# Patient Record
Sex: MALE | Race: WHITE | NOT HISPANIC OR LATINO | Employment: UNEMPLOYED | URBAN - NONMETROPOLITAN AREA
[De-identification: names, ages, dates, MRNs, and addresses within clinical notes are randomized per-mention and may not be internally consistent; named-entity substitution may affect disease eponyms.]

---

## 2017-01-04 ENCOUNTER — OFFICE VISIT (OUTPATIENT)
Dept: FAMILY MEDICINE CLINIC | Facility: CLINIC | Age: 22
End: 2017-01-04

## 2017-01-04 VITALS
DIASTOLIC BLOOD PRESSURE: 85 MMHG | HEART RATE: 87 BPM | WEIGHT: 173 LBS | BODY MASS INDEX: 22.93 KG/M2 | HEIGHT: 73 IN | RESPIRATION RATE: 13 BRPM | SYSTOLIC BLOOD PRESSURE: 139 MMHG | TEMPERATURE: 98.3 F | OXYGEN SATURATION: 100 %

## 2017-01-04 DIAGNOSIS — F41.9 ANXIETY AND DEPRESSION: Primary | ICD-10-CM

## 2017-01-04 DIAGNOSIS — F32.A ANXIETY AND DEPRESSION: Primary | ICD-10-CM

## 2017-01-04 PROBLEM — A74.9 CHLAMYDIA: Status: ACTIVE | Noted: 2017-01-04

## 2017-01-04 PROCEDURE — 99214 OFFICE O/P EST MOD 30 MIN: CPT | Performed by: INTERNAL MEDICINE

## 2017-01-04 RX ORDER — FLUOXETINE 10 MG/1
10 CAPSULE ORAL DAILY
Qty: 30 CAPSULE | Refills: 2 | Status: SHIPPED | OUTPATIENT
Start: 2017-01-04 | End: 2017-02-02 | Stop reason: SDUPTHER

## 2017-01-04 NOTE — PROGRESS NOTES
Chief Complaint   Patient presents with   • Follow-up     Patient is here to be seen for depression and anxiety.        Subjective     History of Present Illness   Duy Thompson is a 21 y.o. male with h/o anxiety and depression who presents for follow-up.  Patient states that he recently saw psychology at [facility] and was advised to consider starting Prozac.  Patient has formerly been on sertraline in the past but states that it made him feel like a zombie.  He would like to try a different SSRI medication to help with his symptoms.  Patient states that his most significant symptoms from his anxiety are typically anger outbreaks which result in bad consequences. He will continue following [facility] for counseling.      The following portions of the patient's history were reviewed and updated as appropriate: allergies, current medications, past family history, past medical history, past social history, past surgical history and problem list.    Review of Systems   Constitutional: Negative.    HENT: Negative.    Eyes: Negative.    Respiratory: Negative.    Cardiovascular: Negative.    Gastrointestinal: Negative.    Endocrine: Negative.    Genitourinary: Negative.    Musculoskeletal: Negative.    Skin: Negative.    Neurological: Negative.    Psychiatric/Behavioral: Positive for decreased concentration and dysphoric mood. The patient is nervous/anxious.        No Known Allergies    Past Medical History   Diagnosis Date   • ADHD (attention deficit hyperactivity disorder)    • Depression    • PTSD (post-traumatic stress disorder)    • Suicide attempt    • UTI (urinary tract infection)        Social History     Social History   • Marital status:      Spouse name: N/A   • Number of children: N/A   • Years of education: N/A     Occupational History   • Not on file.     Social History Main Topics   • Smoking status: Never Smoker   • Smokeless tobacco: Not on file   • Alcohol use No   • Drug use: No   • Sexual  "activity: Defer     Other Topics Concern   • Not on file     Social History Narrative       Past Surgical History   Procedure Laterality Date   • Appendectomy  2013   • Eye surgery Bilateral      as a child       Family History   Problem Relation Age of Onset   • Alcohol abuse Father    • Suicide Attempts Brother    • Alcohol abuse Brother    • Drug abuse Paternal Aunt    • Suicide Attempts Maternal Grandmother    • Drug abuse Cousin    • Lung cancer Paternal Grandmother          Current Outpatient Prescriptions:   •  FLUoxetine (PROZAC) 10 MG capsule, Take 1 capsule by mouth Daily., Disp: 30 capsule, Rfl: 2    Objective   Visit Vitals   • /85 (BP Location: Right arm, Patient Position: Sitting, Cuff Size: Adult)   • Pulse 87   • Temp 98.3 °F (36.8 °C) (Oral)   • Resp 13   • Ht 73\" (185.4 cm)   • Wt 173 lb (78.5 kg)   • SpO2 100%   • BMI 22.82 kg/m2       Physical Exam   Constitutional: He is oriented to person, place, and time. He appears well-developed and well-nourished.   HENT:   Head: Normocephalic and atraumatic.   Eyes: Conjunctivae are normal.   Pulmonary/Chest: Effort normal.   Musculoskeletal: Normal range of motion.   Neurological: He is alert and oriented to person, place, and time.   Skin:   Numerous tattoos   Psychiatric: He has a normal mood and affect. His behavior is normal.   Nursing note and vitals reviewed.      Assessment/Plan   Duy was seen today for follow-up.    Diagnoses and all orders for this visit:    Anxiety and depression  -     FLUoxetine (PROZAC) 10 MG capsule; Take 1 capsule by mouth Daily.          Discussion Summary:  21-year-old white male presenting for anxiety/anger outbreaks/depression.  Patient started on low-dose Prozac as his current symptoms are fairly mild.  He may consider self-tapering to 2 tablets per day after 2 weeks if he feels it is necessary.  Patient was advised to contact the clinic should he do so.  We will follow up in 1 month to ensure appropriate " control of his mood.      Follow up:  Return in about 1 month (around 2/4/2017).     Patient Instructions:  Patient instructions were provided.

## 2017-01-04 NOTE — PATIENT INSTRUCTIONS
Fluoxetine capsules or tablets (Depression/Mood Disorders)  What is this medicine?  FLUOXETINE (floo OX e teen) belongs to a class of drugs known as selective serotonin reuptake inhibitors (SSRIs). It helps to treat mood problems such as depression, obsessive compulsive disorder, and panic attacks. It can also treat certain eating disorders.  This medicine may be used for other purposes; ask your health care provider or pharmacist if you have questions.  What should I tell my health care provider before I take this medicine?  They need to know if you have any of these conditions:  -bipolar disorder or raza  -diabetes  -glaucoma  -liver disease  -psychosis  -seizures  -suicidal thoughts or history of attempted suicide  -an unusual or allergic reaction to fluoxetine, other medicines, foods, dyes, or preservatives  -pregnant or trying to get pregnant  -breast-feeding  How should I use this medicine?  Take this medicine by mouth with a glass of water. Follow the directions on the prescription label. You can take this medicine with or without food. Take your medicine at regular intervals. Do not take it more often than directed. Do not stop taking this medicine suddenly except upon the advice of your doctor. Stopping this medicine too quickly may cause serious side effects or your condition may worsen.  A special MedGuide will be given to you by the pharmacist with each prescription and refill. Be sure to read this information carefully each time.  Talk to your pediatrician regarding the use of this medicine in children. While this drug may be prescribed for children as young as 7 years for selected conditions, precautions do apply.  Overdosage: If you think you have taken too much of this medicine contact a poison control center or emergency room at once.  NOTE: This medicine is only for you. Do not share this medicine with others.  What if I miss a dose?  If you miss a dose, skip the missed dose and go back to your  regular dosing schedule. Do not take double or extra doses.  What may interact with this medicine?  Do not take fluoxetine with any of the following medications:  -other medicines containing fluoxetine, like Sarafem or Symbyax  -cisapride  -linezolid  -MAOIs like Carbex, Eldepryl, Marplan, Nardil, and Parnate  -methylene blue (injected into a vein)  -pimozide  -thioridazine  This medicine may also interact with the following medications:  -alcohol  -aspirin and aspirin-like medicines  -carbamazepine  -certain medicines for depression, anxiety, or psychotic disturbances  -certain medicines for migraine headaches like almotriptan, eletriptan, frovatriptan, naratriptan, rizatriptan, sumatriptan, zolmitriptan  -digoxin  -diuretics  -fentanyl  -flecainide  -furazolidone  -isoniazid  -lithium  -medicines for sleep  -medicines that treat or prevent blood clots like warfarin, enoxaparin, and dalteparin  -NSAIDs, medicines for pain and inflammation, like ibuprofen or naproxen  -phenytoin  -procarbazine  -propafenone  -rasagiline  -ritonavir  -supplements like Angie's wort, kava kava, valerian  -tramadol  -tryptophan  -vinblastine  This list may not describe all possible interactions. Give your health care provider a list of all the medicines, herbs, non-prescription drugs, or dietary supplements you use. Also tell them if you smoke, drink alcohol, or use illegal drugs. Some items may interact with your medicine.  What should I watch for while using this medicine?  Tell your doctor if your symptoms do not get better or if they get worse. Visit your doctor or health care professional for regular checks on your progress. Because it may take several weeks to see the full effects of this medicine, it is important to continue your treatment as prescribed by your doctor.  Patients and their families should watch out for new or worsening thoughts of suicide or depression. Also watch out for sudden changes in feelings such as  feeling anxious, agitated, panicky, irritable, hostile, aggressive, impulsive, severely restless, overly excited and hyperactive, or not being able to sleep. If this happens, especially at the beginning of treatment or after a change in dose, call your health care professional.  You may get drowsy or dizzy. Do not drive, use machinery, or do anything that needs mental alertness until you know how this medicine affects you. Do not stand or sit up quickly, especially if you are an older patient. This reduces the risk of dizzy or fainting spells. Alcohol may interfere with the effect of this medicine. Avoid alcoholic drinks.  Your mouth may get dry. Chewing sugarless gum or sucking hard candy, and drinking plenty of water may help. Contact your doctor if the problem does not go away or is severe.  This medicine may affect blood sugar levels. If you have diabetes, check with your doctor or health care professional before you change your diet or the dose of your diabetic medicine.  What side effects may I notice from receiving this medicine?  Side effects that you should report to your doctor or health care professional as soon as possible:  -allergic reactions like skin rash, itching or hives, swelling of the face, lips, or tongue  -breathing problems  -confusion  -eye pain, changes in vision  -fast or irregular heart rate, palpitations  -flu-like fever, chills, cough, muscle or joint aches and pains  -seizures  -suicidal thoughts or other mood changes  -swelling or redness in or around the eye  -tremors  -trouble sleeping  -unusual bleeding or bruising  -unusually tired or weak  -vomiting  Side effects that usually do not require medical attention (report to your doctor or health care professional if they continue or are bothersome):  -change in sex drive or performance  -diarrhea  -dry mouth  -flushing  -headache  -increased or decreased appetite  -nausea  -sweating  This list may not describe all possible side  effects. Call your doctor for medical advice about side effects. You may report side effects to FDA at 8-281-VCJ-9670.  Where should I keep my medicine?  Keep out of the reach of children.  Store at room temperature between 15 and 30 degrees C (59 and 86 degrees F). Throw away any unused medicine after the expiration date.  NOTE: This sheet is a summary. It may not cover all possible information. If you have questions about this medicine, talk to your doctor, pharmacist, or health care provider.     © 2016, [employer] Standard. (2015-12-11 12:40:07)

## 2017-01-04 NOTE — MR AVS SNAPSHOT
Duy Thompson   1/4/2017 1:00 PM   Office Visit    Dept Phone:  [phone]   Encounter #:  [id number]    Provider:  Devyn Mcelroy DO   Department:  [facility] PRIMARY CARE                Your Full Care Plan              Today's Medication Changes          These changes are accurate as of: 1/4/17  2:57 PM.  If you have any questions, ask your nurse or doctor.               New Medication(s)Ordered:     FLUoxetine 10 MG capsule   Commonly known as:  PROZAC   Take 1 capsule by mouth Daily.         Stop taking medication(s)listed here:     azithromycin 250 MG tablet   Commonly known as:  ZITHROMAX Z-KLAUS           doxycycline 100 MG capsule   Commonly known as:  MONODOX                Where to Get Your Medications      These medications were sent to [address] - [address] [place] [address] - [phone] [place] [id number] [address] [address]     Phone:  [phone]     FLUoxetine 10 MG capsule                  Your Updated Medication List          This list is accurate as of: 1/4/17  2:57 PM.  Always use your most recent med list.                FLUoxetine 10 MG capsule   Commonly known as:  PROZAC   Take 1 capsule by mouth Daily.               You Were Diagnosed With        Codes Comments    Anxiety and depression    -  Primary ICD-10-CM: F41.9, F32.9  ICD-9-CM: 300.00, 311       Instructions    Fluoxetine capsules or tablets (Depression/Mood Disorders)  What is this medicine?  FLUOXETINE (floo OX e teen) belongs to a class of drugs known as selective serotonin reuptake inhibitors (SSRIs). It helps to treat mood problems such as depression, obsessive compulsive disorder, and panic attacks. It can also treat certain eating disorders.  This medicine may be used for other purposes; ask your health care provider or pharmacist if you have questions.  What should I tell my health care provider before I take this medicine?  They need  to know if you have any of these conditions:  -bipolar disorder or raza  -diabetes  -glaucoma  -liver disease  -psychosis  -seizures  -suicidal thoughts or history of attempted suicide  -an unusual or allergic reaction to fluoxetine, other medicines, foods, dyes, or preservatives  -pregnant or trying to get pregnant  -breast-feeding  How should I use this medicine?  Take this medicine by mouth with a glass of water. Follow the directions on the prescription label. You can take this medicine with or without food. Take your medicine at regular intervals. Do not take it more often than directed. Do not stop taking this medicine suddenly except upon the advice of your doctor. Stopping this medicine too quickly may cause serious side effects or your condition may worsen.  A special MedGuide will be given to you by the pharmacist with each prescription and refill. Be sure to read this information carefully each time.  Talk to your pediatrician regarding the use of this medicine in children. While this drug may be prescribed for children as young as 7 years for selected conditions, precautions do apply.  Overdosage: If you think you have taken too much of this medicine contact a poison control center or emergency room at once.  NOTE: This medicine is only for you. Do not share this medicine with others.  What if I miss a dose?  If you miss a dose, skip the missed dose and go back to your regular dosing schedule. Do not take double or extra doses.  What may interact with this medicine?  Do not take fluoxetine with any of the following medications:  -other medicines containing fluoxetine, like Sarafem or Symbyax  -cisapride  -linezolid  -MAOIs like Carbex, Eldepryl, Marplan, Nardil, and Parnate  -methylene blue (injected into a vein)  -pimozide  -thioridazine  This medicine may also interact with the following medications:  -alcohol  -aspirin and aspirin-like medicines  -carbamazepine  -certain medicines for depression,  anxiety, or psychotic disturbances  -certain medicines for migraine headaches like almotriptan, eletriptan, frovatriptan, naratriptan, rizatriptan, sumatriptan, zolmitriptan  -digoxin  -diuretics  -fentanyl  -flecainide  -furazolidone  -isoniazid  -lithium  -medicines for sleep  -medicines that treat or prevent blood clots like warfarin, enoxaparin, and dalteparin  -NSAIDs, medicines for pain and inflammation, like ibuprofen or naproxen  -phenytoin  -procarbazine  -propafenone  -rasagiline  -ritonavir  -supplements like Angie's wort, kava kava, valerian  -tramadol  -tryptophan  -vinblastine  This list may not describe all possible interactions. Give your health care provider a list of all the medicines, herbs, non-prescription drugs, or dietary supplements you use. Also tell them if you smoke, drink alcohol, or use illegal drugs. Some items may interact with your medicine.  What should I watch for while using this medicine?  Tell your doctor if your symptoms do not get better or if they get worse. Visit your doctor or health care professional for regular checks on your progress. Because it may take several weeks to see the full effects of this medicine, it is important to continue your treatment as prescribed by your doctor.  Patients and their families should watch out for new or worsening thoughts of suicide or depression. Also watch out for sudden changes in feelings such as feeling anxious, agitated, panicky, irritable, hostile, aggressive, impulsive, severely restless, overly excited and hyperactive, or not being able to sleep. If this happens, especially at the beginning of treatment or after a change in dose, call your health care professional.  You may get drowsy or dizzy. Do not drive, use machinery, or do anything that needs mental alertness until you know how this medicine affects you. Do not stand or sit up quickly, especially if you are an older patient. This reduces the risk of dizzy or fainting  spells. Alcohol may interfere with the effect of this medicine. Avoid alcoholic drinks.  Your mouth may get dry. Chewing sugarless gum or sucking hard candy, and drinking plenty of water may help. Contact your doctor if the problem does not go away or is severe.  This medicine may affect blood sugar levels. If you have diabetes, check with your doctor or health care professional before you change your diet or the dose of your diabetic medicine.  What side effects may I notice from receiving this medicine?  Side effects that you should report to your doctor or health care professional as soon as possible:  -allergic reactions like skin rash, itching or hives, swelling of the face, lips, or tongue  -breathing problems  -confusion  -eye pain, changes in vision  -fast or irregular heart rate, palpitations  -flu-like fever, chills, cough, muscle or joint aches and pains  -seizures  -suicidal thoughts or other mood changes  -swelling or redness in or around the eye  -tremors  -trouble sleeping  -unusual bleeding or bruising  -unusually tired or weak  -vomiting  Side effects that usually do not require medical attention (report to your doctor or health care professional if they continue or are bothersome):  -change in sex drive or performance  -diarrhea  -dry mouth  -flushing  -headache  -increased or decreased appetite  -nausea  -sweating  This list may not describe all possible side effects. Call your doctor for medical advice about side effects. You may report side effects to FDA at 2-169-FDA-9179.  Where should I keep my medicine?  Keep out of the reach of children.  Store at room temperature between 15 and 30 degrees C (59 and 86 degrees F). Throw away any unused medicine after the expiration date.  NOTE: This sheet is a summary. It may not cover all possible information. If you have questions about this medicine, talk to your doctor, pharmacist, or health care provider.     © 2016, [employer] Standard. (2015-12-11  "12:40:07)       Patient Instructions History      Upcoming Appointments     Visit Type Date Time Department    OFFICE VISIT 2017  1:00 PM [facility]      [employer] Signup     [facility] [employer] allows you to send messages to your doctor, view your test results, renew your prescriptions, schedule appointments, and more. To sign up, go to [employer] and click on the Sign Up Now link in the New User? box. Enter your [employer] Activation Code exactly as it appears below along with the last four digits of your Social Security Number and your Date of Birth () to complete the sign-up process. If you do not sign up before the expiration date, you must request a new code.    [employer] Activation Code: [email or web address]  Expires: 2017  5:36 AM    If you have questions, you can email [email or web address] or call [phone] to talk to our [employer] staff. Remember, [employer] is NOT to be used for urgent needs. For medical emergencies, dial 911.               Other Info from Your Visit           Allergies     No Known Allergies      Reason for Visit     Follow-up Patient is here to be seen for depression and anxiety.       Vital Signs     Blood Pressure Pulse Temperature Respirations Height Weight    139/85 (BP Location: Right arm, Patient Position: Sitting, Cuff Size: Adult) 87 98.3 °F (36.8 °C) (Oral) 13 73\" (185.4 cm) 173 lb (78.5 kg)    Oxygen Saturation Body Mass Index Smoking Status             100% 22.82 kg/m2 Never Smoker         Problems and Diagnoses Noted     Chlamydia    Anxiety and depression    -  Primary        "

## 2017-02-02 DIAGNOSIS — F32.A ANXIETY AND DEPRESSION: ICD-10-CM

## 2017-02-02 DIAGNOSIS — F41.9 ANXIETY AND DEPRESSION: ICD-10-CM

## 2017-02-02 RX ORDER — FLUOXETINE 10 MG/1
10 CAPSULE ORAL DAILY
Qty: 30 CAPSULE | Refills: 2 | Status: SHIPPED | OUTPATIENT
Start: 2017-02-02 | End: 2017-03-29 | Stop reason: SDUPTHER

## 2017-03-23 ENCOUNTER — TELEPHONE (OUTPATIENT)
Dept: FAMILY MEDICINE CLINIC | Facility: CLINIC | Age: 22
End: 2017-03-23

## 2017-03-29 ENCOUNTER — OFFICE VISIT (OUTPATIENT)
Dept: FAMILY MEDICINE CLINIC | Facility: CLINIC | Age: 22
End: 2017-03-29

## 2017-03-29 VITALS
BODY MASS INDEX: 22.13 KG/M2 | HEIGHT: 73 IN | WEIGHT: 167 LBS | DIASTOLIC BLOOD PRESSURE: 71 MMHG | SYSTOLIC BLOOD PRESSURE: 127 MMHG | OXYGEN SATURATION: 99 % | HEART RATE: 110 BPM | TEMPERATURE: 99.6 F | RESPIRATION RATE: 16 BRPM

## 2017-03-29 DIAGNOSIS — F32.A ANXIETY AND DEPRESSION: ICD-10-CM

## 2017-03-29 DIAGNOSIS — A74.9 CHLAMYDIA: Primary | ICD-10-CM

## 2017-03-29 DIAGNOSIS — F41.9 ANXIETY AND DEPRESSION: ICD-10-CM

## 2017-03-29 PROCEDURE — 99213 OFFICE O/P EST LOW 20 MIN: CPT | Performed by: INTERNAL MEDICINE

## 2017-03-29 PROCEDURE — 96372 THER/PROPH/DIAG INJ SC/IM: CPT | Performed by: INTERNAL MEDICINE

## 2017-03-29 RX ORDER — FLUOXETINE 10 MG/1
10 CAPSULE ORAL DAILY
Qty: 30 CAPSULE | Refills: 2 | Status: SHIPPED | OUTPATIENT
Start: 2017-03-29 | End: 2017-06-01 | Stop reason: SDUPTHER

## 2017-03-29 RX ORDER — CEFTRIAXONE 1 G/1
0.25 INJECTION, POWDER, FOR SOLUTION INTRAMUSCULAR; INTRAVENOUS EVERY 24 HOURS
Status: DISCONTINUED | OUTPATIENT
Start: 2017-03-29 | End: 2017-03-29

## 2017-03-29 RX ORDER — CEFTRIAXONE 1 G/1
0.25 INJECTION, POWDER, FOR SOLUTION INTRAMUSCULAR; INTRAVENOUS ONCE
Status: COMPLETED | OUTPATIENT
Start: 2017-03-29 | End: 2017-03-29

## 2017-03-29 RX ORDER — AZITHROMYCIN 250 MG/1
TABLET, FILM COATED ORAL
Qty: 4 TABLET | Refills: 0 | Status: SHIPPED | OUTPATIENT
Start: 2017-03-29 | End: 2017-06-01

## 2017-03-29 RX ORDER — FLUOXETINE 10 MG/1
10 CAPSULE ORAL DAILY
Qty: 30 CAPSULE | Refills: 2 | Status: SHIPPED | OUTPATIENT
Start: 2017-03-29 | End: 2017-03-29 | Stop reason: SDUPTHER

## 2017-03-29 RX ADMIN — CEFTRIAXONE 0.25 G: 1 INJECTION, POWDER, FOR SOLUTION INTRAMUSCULAR; INTRAVENOUS at 17:58

## 2017-03-29 NOTE — PROGRESS NOTES
Fells good with Prozac. Wants to up the dose of Prozac? Mood is better. Not following up with a psychologist    Congestion for few days. Fever, chills, cough with yellow-green sputum. No chest pain, chest congestion, no abdominal pain, head aches,     Chlamydia: One week ago, urethral burning, throat itching, no discharge. Dysuria, no blood in urine, Sexually active with one female, she has been treated and abstaining from sex. Last episode chlamydia was in September.

## 2017-03-29 NOTE — PROGRESS NOTES
Chief Complaint   Patient presents with   • Follow-up     Patient states he thinks he has an STD. Patient states he needs medication refills.    • Sore Throat       Subjective     History of Present Illness   Duy Thompson is a 21 y.o. male with h/o high risk sexual behaviors presenting for concerns of recurrence of chlamydia.  Patient  States he has had burning at the tip of his penis and mild discharge for the last 1 week.  He also has concerns about cough with yellow- green sputum.  Pt has abstained from sex since symptoms began.  He is sexually active with one female in close relationship recently.    The following portions of the patient's history were reviewed and updated as appropriate: allergies, current medications, past family history, past medical history, past social history, past surgical history and problem list.    Review of Systems   Constitutional: Negative for chills, fatigue and fever.   HENT: Negative for congestion, ear pain, rhinorrhea, sinus pressure and sore throat.    Eyes: Negative for visual disturbance.   Respiratory: Negative for cough, chest tightness, shortness of breath and wheezing.    Cardiovascular: Negative for chest pain, palpitations and leg swelling.   Gastrointestinal: Negative for abdominal pain, blood in stool, constipation, diarrhea, nausea and vomiting.   Endocrine: Negative for polydipsia and polyuria.   Genitourinary: Positive for discharge and dysuria. Negative for hematuria.   Musculoskeletal: Negative for back pain.   Skin: Negative for rash.   Neurological: Negative for dizziness, light-headedness, numbness and headaches.   Psychiatric/Behavioral: Negative for dysphoric mood and sleep disturbance. The patient is not nervous/anxious.        No Known Allergies    Past Medical History:   Diagnosis Date   • ADHD (attention deficit hyperactivity disorder)    • Depression    • PTSD (post-traumatic stress disorder)    • Suicide attempt    • UTI (urinary tract infection)   "      Social History     Social History   • Marital status:      Spouse name: N/A   • Number of children: N/A   • Years of education: N/A     Occupational History   • Not on file.     Social History Main Topics   • Smoking status: Never Smoker   • Smokeless tobacco: Not on file   • Alcohol use No   • Drug use: No   • Sexual activity: Defer     Other Topics Concern   • Not on file     Social History Narrative        Past Surgical History:   Procedure Laterality Date   • APPENDECTOMY  2013   • EYE SURGERY Bilateral     as a child       Family History   Problem Relation Age of Onset   • Alcohol abuse Father    • Suicide Attempts Brother    • Alcohol abuse Brother    • Drug abuse Paternal Aunt    • Suicide Attempts Maternal Grandmother    • Drug abuse Cousin    • Lung cancer Paternal Grandmother          Current Outpatient Prescriptions:   •  FLUoxetine (PROZAC) 10 MG capsule, Take 1 capsule by mouth Daily., Disp: 30 capsule, Rfl: 2  •  azithromycin (ZITHROMAX Z-KLAUS) 250 MG tablet, Take 1 gram once.  (4 tabs), Disp: 4 tablet, Rfl: 0    Current Facility-Administered Medications:   •  cefTRIAXone (ROCEPHIN) injection 0.25 g, 0.25 g, Intramuscular, Once, Devyn Mcelroy,     Objective   /71 (BP Location: Right arm, Patient Position: Sitting, Cuff Size: Adult)  Pulse 110  Temp 99.6 °F (37.6 °C) (Oral)   Resp 16  Ht 73\" (185.4 cm)  Wt 167 lb (75.8 kg)  SpO2 99%  BMI 22.03 kg/m2    Physical Exam   Constitutional: He is oriented to person, place, and time. He appears well-developed and well-nourished.   HENT:   Head: Normocephalic and atraumatic.   Eyes: Conjunctivae are normal.   Pulmonary/Chest: Effort normal.   Musculoskeletal: Normal range of motion.   Neurological: He is alert and oriented to person, place, and time.   Psychiatric: He has a normal mood and affect. His behavior is normal.   Nursing note and vitals reviewed.      Assessment/Plan   Duy was seen today for follow-up and sore " throat.    Diagnoses and all orders for this visit:    Chlamydia  -     Discontinue: cefTRIAXone (ROCEPHIN) injection 0.25 g; Inject 0.25 g into the shoulder, thigh, or buttocks Daily.  -     azithromycin (ZITHROMAX Z-KLAUS) 250 MG tablet; Take 1 gram once.  (4 tabs)  -     C.trachomatis/N. gonorrhoeae PCR Urine  -     cefTRIAXone (ROCEPHIN) injection 0.25 g; Inject 0.25 g into the shoulder, thigh, or buttocks 1 (One) Time.    Anxiety and depression  -     Discontinue: FLUoxetine (PROZAC) 10 MG capsule; Take 1 capsule by mouth Daily.  -     FLUoxetine (PROZAC) 10 MG capsule; Take 1 capsule by mouth Daily.          Discussion Summary:    20 yo W M presenting with chlamydia.  Ceftriaxone and 1g azithromycin started.  Check urine studies.     Prozac is controlling anxiety symptoms, refilled today, cont current dose.    Follow up:  No Follow-up on file.     Patient Instructions:  Patient instructions were provided.

## 2017-03-29 NOTE — PATIENT INSTRUCTIONS
Chlamydia, Male  Chlamydia is an infection. It is spread through sexual contact. Chlamydia can be in different areas of the body. These areas include the urethra, throat, or rectum. It is important to treat chlamydia as soon as possible. It can damage other organs.   CAUSES   Chlamydia is caused by bacteria. It is a sexually transmitted disease. This means that it is passed from an infected partner during intimate contact. This contact could be with the genitals, mouth, or rectal area.   SIGNS AND SYMPTOMS   There may not be any symptoms. This is often the case early in the infection. If there are symptoms, they are usually mild and may only be noticeable in the morning. Symptoms you may notice include:   · Burning with urination.  · Pain or swelling in the testicles.  · Watery mucus-like discharge from the penis.  · Long-standing (chronic) pelvic pain after frequent infections.  · Pain, swelling, or itching around the anus.  · A sore throat.  · Itching, burning, or redness in the eyes, or discharge from the eyes.  DIAGNOSIS   To diagnose this infection, your health care provider will do a pelvic exam. A sample of urine or a swab from the rectum may be taken for testing.   TREATMENT   Chlamydia is treated with antibiotic medicines. Your health care provider may test you for infection again 3 months after treatment.  HOME CARE INSTRUCTIONS  · Take your antibiotic medicine as directed by your health care provider. Finish the antibiotic even if you start to feel better. Incomplete treatment will put you at risk for not being able to have children (sterility).    · Take medicines only as directed by your health care provider.    · Rest.    · Inform any sexual partners about your infection. Even if they are symptom free or have a negative culture or evaluation, they should be treated for the condition.    · Do not have sex (intercourse) until treatment is completed and your health care provider says it is okay.    · Keep  all follow-up visits as directed by your health care provider.    · Not all test results are available during your visit. If your test results are not back during the visit, make an appointment with your health care provider to find out the results. Do not assume everything is normal if you have not heard from your health care provider or the medical facility. It is your responsibility to get your test results.  SEEK MEDICAL CARE IF:  · You develop new joint pain.  · You have a fever.  SEEK IMMEDIATE MEDICAL CARE IF:   · Your pain increases.    · You have abnormal discharge.    · You have pain during intercourse.  MAKE SURE YOU:   · Understand these instructions.  · Will watch your condition.  · Will get help right away if you are not doing well or get worse.     This information is not intended to replace advice given to you by your health care provider. Make sure you discuss any questions you have with your health care provider.     Document Released: 12/18/2006 Document Revised: 01/08/2016 Document Reviewed: 06/26/2014  Elsevier Interactive Patient Education ©2016 [employer]

## 2017-03-31 LAB
C TRACH RRNA SPEC QL NAA+PROBE: NEGATIVE
N GONORRHOEA RRNA SPEC QL NAA+PROBE: NEGATIVE

## 2017-04-26 NOTE — TELEPHONE ENCOUNTER
----- Message from Karli Lacy sent at 3/23/2017  3:20 PM EDT -----  Pt called asking to get a refill on an antibiotic for chlamydia [facility] [place]   
Patient called I scheduled him for 04/04/17.  
Pt needs appt, or can see UTC
16

## 2017-05-23 DIAGNOSIS — A74.9 CHLAMYDIA: ICD-10-CM

## 2017-05-23 RX ORDER — AZITHROMYCIN 250 MG/1
TABLET, FILM COATED ORAL
Qty: 4 TABLET | Refills: 0 | OUTPATIENT
Start: 2017-05-23

## 2017-05-30 ENCOUNTER — TELEPHONE (OUTPATIENT)
Dept: FAMILY MEDICINE CLINIC | Facility: CLINIC | Age: 22
End: 2017-05-30

## 2017-06-01 ENCOUNTER — OFFICE VISIT (OUTPATIENT)
Dept: FAMILY MEDICINE CLINIC | Facility: CLINIC | Age: 22
End: 2017-06-01

## 2017-06-01 VITALS
DIASTOLIC BLOOD PRESSURE: 77 MMHG | HEART RATE: 77 BPM | SYSTOLIC BLOOD PRESSURE: 118 MMHG | BODY MASS INDEX: 23.19 KG/M2 | OXYGEN SATURATION: 98 % | HEIGHT: 73 IN | TEMPERATURE: 98.9 F | WEIGHT: 175 LBS

## 2017-06-01 DIAGNOSIS — F32.A ANXIETY AND DEPRESSION: ICD-10-CM

## 2017-06-01 DIAGNOSIS — A54.9 GONORRHEA: Primary | ICD-10-CM

## 2017-06-01 DIAGNOSIS — F41.9 ANXIETY AND DEPRESSION: ICD-10-CM

## 2017-06-01 PROCEDURE — 96372 THER/PROPH/DIAG INJ SC/IM: CPT | Performed by: INTERNAL MEDICINE

## 2017-06-01 PROCEDURE — 99214 OFFICE O/P EST MOD 30 MIN: CPT | Performed by: INTERNAL MEDICINE

## 2017-06-01 RX ORDER — FLUOXETINE HYDROCHLORIDE 20 MG/1
20 CAPSULE ORAL DAILY
Qty: 30 CAPSULE | Refills: 2 | Status: SHIPPED | OUTPATIENT
Start: 2017-06-01 | End: 2019-04-12

## 2017-06-01 RX ORDER — CEFTRIAXONE 1 G/1
1 INJECTION, POWDER, FOR SOLUTION INTRAMUSCULAR; INTRAVENOUS EVERY 24 HOURS
Status: DISCONTINUED | OUTPATIENT
Start: 2017-06-01 | End: 2017-06-01

## 2017-06-01 RX ORDER — AZITHROMYCIN 500 MG/1
TABLET, FILM COATED ORAL
Qty: 2 TABLET | Refills: 0 | Status: SHIPPED | OUTPATIENT
Start: 2017-06-01 | End: 2019-04-12

## 2017-06-01 RX ORDER — CEFTRIAXONE 1 G/1
1 INJECTION, POWDER, FOR SOLUTION INTRAMUSCULAR; INTRAVENOUS ONCE
Status: DISCONTINUED | OUTPATIENT
Start: 2017-06-01 | End: 2019-12-11

## 2017-06-01 RX ADMIN — CEFTRIAXONE 1 G: 1 INJECTION, POWDER, FOR SOLUTION INTRAMUSCULAR; INTRAVENOUS at 18:03

## 2017-06-01 NOTE — PATIENT INSTRUCTIONS
Chlamydia, Male  Chlamydia is an infection. It is spread through sexual contact. Chlamydia can be in different areas of the body. These areas include the urethra, throat, or rectum. It is important to treat chlamydia as soon as possible. It can damage other organs.   CAUSES   Chlamydia is caused by bacteria. It is a sexually transmitted disease. This means that it is passed from an infected partner during intimate contact. This contact could be with the genitals, mouth, or rectal area.   SIGNS AND SYMPTOMS   There may not be any symptoms. This is often the case early in the infection. If there are symptoms, they are usually mild and may only be noticeable in the morning. Symptoms you may notice include:   · Burning with urination.  · Pain or swelling in the testicles.  · Watery mucus-like discharge from the penis.  · Long-standing (chronic) pelvic pain after frequent infections.  · Pain, swelling, or itching around the anus.  · A sore throat.  · Itching, burning, or redness in the eyes, or discharge from the eyes.  DIAGNOSIS   To diagnose this infection, your health care provider will do a pelvic exam. A sample of urine or a swab from the rectum may be taken for testing.   TREATMENT   Chlamydia is treated with antibiotic medicines. Your health care provider may test you for infection again 3 months after treatment.  HOME CARE INSTRUCTIONS  · Take your antibiotic medicine as directed by your health care provider. Finish the antibiotic even if you start to feel better. Incomplete treatment will put you at risk for not being able to have children (sterility).    · Take medicines only as directed by your health care provider.    · Rest.    · Inform any sexual partners about your infection. Even if they are symptom free or have a negative culture or evaluation, they should be treated for the condition.    · Do not have sex (intercourse) until treatment is completed and your health care provider says it is okay.    · Keep  all follow-up visits as directed by your health care provider.    · Not all test results are available during your visit. If your test results are not back during the visit, make an appointment with your health care provider to find out the results. Do not assume everything is normal if you have not heard from your health care provider or the medical facility. It is your responsibility to get your test results.  SEEK MEDICAL CARE IF:  · You develop new joint pain.  · You have a fever.  SEEK IMMEDIATE MEDICAL CARE IF:   · Your pain increases.    · You have abnormal discharge.    · You have pain during intercourse.  MAKE SURE YOU:   · Understand these instructions.  · Will watch your condition.  · Will get help right away if you are not doing well or get worse.     This information is not intended to replace advice given to you by your health care provider. Make sure you discuss any questions you have with your health care provider.     Document Released: 12/18/2006 Document Revised: 01/08/2016 Document Reviewed: 06/26/2014  Elsevier Interactive Patient Education ©2017 [employer]

## 2017-06-01 NOTE — PROGRESS NOTES
Chief Complaint   Patient presents with   • Follow-up     chlamydia; and anxiety       Subjective     History of Present Illness   Duy Thompson is a 21 y.o. male presenting with recurrent symptoms of dysuria, pelvic pain, burning, and symptoms consistent with prior episodes of chlamydia.  Patient has been sexually active, his partner is in the room and has also had symptoms and is to start treatment.     Depression is also less controlled recently with low dose prozac, he requests to increase medication dose.  The medication was helping earlier however it seems less potent per patient.    The following portions of the patient's history were reviewed and updated as appropriate: allergies, current medications, past family history, past medical history, past social history, past surgical history and problem list.    Review of Systems   Constitutional: Negative for chills, fatigue and fever.   HENT: Negative for congestion, ear pain, rhinorrhea, sinus pressure and sore throat.    Eyes: Negative for visual disturbance.   Respiratory: Negative for cough, chest tightness, shortness of breath and wheezing.    Cardiovascular: Negative for chest pain, palpitations and leg swelling.   Gastrointestinal: Negative for abdominal pain, blood in stool, constipation, diarrhea, nausea and vomiting.   Endocrine: Negative for polydipsia and polyuria.   Genitourinary: Negative for dysuria and hematuria.   Musculoskeletal: Negative for back pain.   Skin: Negative for rash.   Neurological: Negative for dizziness, light-headedness, numbness and headaches.   Psychiatric/Behavioral: Negative for dysphoric mood and sleep disturbance. The patient is not nervous/anxious.        No Known Allergies    Past Medical History:   Diagnosis Date   • ADHD (attention deficit hyperactivity disorder)    • Depression    • PTSD (post-traumatic stress disorder)    • Suicide attempt    • UTI (urinary tract infection)        Social History     Social History  "  • Marital status:      Spouse name: N/A   • Number of children: N/A   • Years of education: N/A     Occupational History   • Not on file.     Social History Main Topics   • Smoking status: Never Smoker   • Smokeless tobacco: Not on file   • Alcohol use No   • Drug use: No   • Sexual activity: Defer     Other Topics Concern   • Not on file     Social History Narrative        Past Surgical History:   Procedure Laterality Date   • APPENDECTOMY  2013   • EYE SURGERY Bilateral     as a child       Family History   Problem Relation Age of Onset   • Alcohol abuse Father    • Suicide Attempts Brother    • Alcohol abuse Brother    • Drug abuse Paternal Aunt    • Suicide Attempts Maternal Grandmother    • Drug abuse Cousin    • Lung cancer Paternal Grandmother          Current Outpatient Prescriptions:   •  FLUoxetine (PROzac) 20 MG capsule, Take 1 capsule by mouth Daily., Disp: 30 capsule, Rfl: 2  •  azithromycin (ZITHROMAX) 500 MG tablet, Take 2 tablets the first day, then 1 tablet daily for 4 days., Disp: 2 tablet, Rfl: 0    Current Facility-Administered Medications:   •  cefTRIAXone (ROCEPHIN) injection 1 g, 1 g, Intramuscular, Once, Devyn Mcelroy DO    Objective   /77 (BP Location: Right arm, Patient Position: Sitting)  Pulse 77  Temp 98.9 °F (37.2 °C)  Ht 73\" (185.4 cm)  Wt 175 lb (79.4 kg)  SpO2 98%  BMI 23.09 kg/m2    Physical Exam   Constitutional: He is oriented to person, place, and time. He appears well-developed and well-nourished.   HENT:   Head: Normocephalic and atraumatic.   Eyes: Conjunctivae are normal.   Pulmonary/Chest: Effort normal.   Musculoskeletal: Normal range of motion.   Neurological: He is alert and oriented to person, place, and time.   Psychiatric: He has a normal mood and affect. His behavior is normal.   Nursing note and vitals reviewed.      Assessment/Plan   Duy was seen today for follow-up.    Diagnoses and all orders for this visit:    Gonorrhea  -     " azithromycin (ZITHROMAX) 500 MG tablet; Take 2 tablets the first day, then 1 tablet daily for 4 days.  -     Discontinue: cefTRIAXone (ROCEPHIN) injection 1 g; Inject 1 g into the shoulder, thigh, or buttocks Daily.  -     cefTRIAXone (ROCEPHIN) injection 1 g; Inject 1 g into the shoulder, thigh, or buttocks 1 (One) Time.    Anxiety and depression  -     FLUoxetine (PROzac) 20 MG capsule; Take 1 capsule by mouth Daily.        Discussion Summary:    22 yo W M presenting for follow up.    1. Gonorrhea/ Chlamydia  - treat with azithromycin and Rocephin inj.    2. Anxiety and Depression - uncontrolled  - prozac increased to 20 from 10.        Follow up:  Return in about 6 weeks (around 7/13/2017).     Patient Instructions:  Patient instructions were provided.

## 2017-06-02 DIAGNOSIS — A74.9 CHLAMYDIA: Primary | ICD-10-CM

## 2017-06-05 LAB
C TRACH RRNA SPEC QL NAA+PROBE: NEGATIVE
N GONORRHOEA RRNA SPEC QL NAA+PROBE: NEGATIVE

## 2017-10-10 ENCOUNTER — HOSPITAL ENCOUNTER (EMERGENCY)
Facility: HOSPITAL | Age: 22
Discharge: HOME | End: 2017-10-10
Attending: EMERGENCY MEDICINE | Admitting: EMERGENCY MEDICINE

## 2017-10-10 ENCOUNTER — APPOINTMENT (OUTPATIENT)
Dept: CT IMAGING | Facility: HOSPITAL | Age: 22
End: 2017-10-10

## 2017-10-10 VITALS
HEIGHT: 73 IN | TEMPERATURE: 98.7 F | OXYGEN SATURATION: 98 % | SYSTOLIC BLOOD PRESSURE: 128 MMHG | WEIGHT: 185 LBS | RESPIRATION RATE: 25 BRPM | DIASTOLIC BLOOD PRESSURE: 86 MMHG | BODY MASS INDEX: 24.52 KG/M2 | HEART RATE: 83 BPM

## 2017-10-10 DIAGNOSIS — R55 POSTURAL DIZZINESS WITH PRESYNCOPE: Primary | ICD-10-CM

## 2017-10-10 DIAGNOSIS — R42 POSTURAL DIZZINESS WITH PRESYNCOPE: Primary | ICD-10-CM

## 2017-10-10 LAB
ALBUMIN SERPL-MCNC: 4.8 G/DL (ref 3.5–5)
ALBUMIN/GLOB SERPL: 1.9 G/DL (ref 1–2)
ALP SERPL-CCNC: 61 U/L (ref 38–126)
ALT SERPL W P-5'-P-CCNC: 70 U/L (ref 13–69)
AMPHET+METHAMPHET UR QL: NEGATIVE
AMPHETAMINES UR QL: NEGATIVE
ANION GAP SERPL CALCULATED.3IONS-SCNC: 17.8 MMOL/L
ARTERIAL PATENCY WRIST A: ABNORMAL
AST SERPL-CCNC: 28 U/L (ref 15–46)
BARBITURATES UR QL SCN: NEGATIVE
BASE EXCESS BLDA CALC-SCNC: 2.3 MMOL/L
BASOPHILS # BLD AUTO: 0.07 10*3/MM3 (ref 0–0.2)
BASOPHILS NFR BLD AUTO: 1.2 % (ref 0–2.5)
BDY SITE: ABNORMAL
BENZODIAZ UR QL SCN: NEGATIVE
BILIRUB SERPL-MCNC: 0.8 MG/DL (ref 0.2–1.3)
BILIRUB UR QL STRIP: NEGATIVE
BUN BLD-MCNC: 9 MG/DL (ref 7–20)
BUN/CREAT SERPL: 11.3 (ref 6.3–21.9)
BUPRENORPHINE SERPL-MCNC: NEGATIVE NG/ML
CALCIUM SPEC-SCNC: 9.6 MG/DL (ref 8.4–10.2)
CANNABINOIDS SERPL QL: NEGATIVE
CHLORIDE SERPL-SCNC: 104 MMOL/L (ref 98–107)
CLARITY UR: CLEAR
CO2 SERPL-SCNC: 28 MMOL/L (ref 26–30)
COCAINE UR QL: NEGATIVE
COHGB MFR BLD: 0.7 %
COLOR UR: YELLOW
CREAT BLD-MCNC: 0.8 MG/DL (ref 0.6–1.3)
DEPRECATED RDW RBC AUTO: 39.9 FL (ref 37–54)
EOSINOPHIL # BLD AUTO: 0.11 10*3/MM3 (ref 0–0.7)
EOSINOPHIL NFR BLD AUTO: 1.8 % (ref 0–7)
ERYTHROCYTE [DISTWIDTH] IN BLOOD BY AUTOMATED COUNT: 11.9 % (ref 11.5–14.5)
GFR SERPL CREATININE-BSD FRML MDRD: 119 ML/MIN/1.73
GLOBULIN UR ELPH-MCNC: 2.5 GM/DL
GLUCOSE BLD-MCNC: 90 MG/DL (ref 74–98)
GLUCOSE BLDC GLUCOMTR-MCNC: 84 MG/DL (ref 70–130)
GLUCOSE UR STRIP-MCNC: NEGATIVE MG/DL
HCO3 BLDA-SCNC: 26.2 MMOL/L (ref 22–28)
HCT VFR BLD AUTO: 45.2 % (ref 42–52)
HGB BLD-MCNC: 15.1 G/DL (ref 14–18)
HGB BLDA-MCNC: 15.3 G/DL (ref 12–18)
HGB UR QL STRIP.AUTO: NEGATIVE
HOLD SPECIMEN: NORMAL
HOLD SPECIMEN: NORMAL
HOROWITZ INDEX BLD+IHG-RTO: 21 %
IMM GRANULOCYTES # BLD: 0.03 10*3/MM3 (ref 0–0.06)
IMM GRANULOCYTES NFR BLD: 0.5 % (ref 0–0.6)
KETONES UR QL STRIP: NEGATIVE
LEUKOCYTE ESTERASE UR QL STRIP.AUTO: NEGATIVE
LYMPHOCYTES # BLD AUTO: 1.94 10*3/MM3 (ref 0.6–3.4)
LYMPHOCYTES NFR BLD AUTO: 32.1 % (ref 10–50)
MCH RBC QN AUTO: 30.7 PG (ref 27–31)
MCHC RBC AUTO-ENTMCNC: 33.4 G/DL (ref 30–37)
MCV RBC AUTO: 91.9 FL (ref 80–94)
METHADONE UR QL SCN: NEGATIVE
METHGB BLD QL: 0.8 %
MODALITY: ABNORMAL
MONOCYTES # BLD AUTO: 0.38 10*3/MM3 (ref 0–0.9)
MONOCYTES NFR BLD AUTO: 6.3 % (ref 0–12)
NEUTROPHILS # BLD AUTO: 3.51 10*3/MM3 (ref 2–6.9)
NEUTROPHILS NFR BLD AUTO: 58.1 % (ref 37–80)
NITRITE UR QL STRIP: NEGATIVE
NRBC BLD MANUAL-RTO: 0 /100 WBC (ref 0–0)
OPIATES UR QL: NEGATIVE
OXYCODONE UR QL SCN: NEGATIVE
OXYHGB MFR BLDV: 97.4 % (ref 94–99)
PCO2 BLDA: 37.1 MM HG (ref 35–45)
PCP UR QL SCN: NEGATIVE
PH BLDA: 7.46 PH UNITS (ref 7.3–7.5)
PH UR STRIP.AUTO: 7.5 [PH] (ref 5–8)
PLATELET # BLD AUTO: 203 10*3/MM3 (ref 130–400)
PMV BLD AUTO: 11.3 FL (ref 6–12)
PO2 BLDA: 102 MM HG (ref 75–100)
POTASSIUM BLD-SCNC: 3.8 MMOL/L (ref 3.5–5.1)
PROPOXYPH UR QL: NEGATIVE
PROT SERPL-MCNC: 7.3 G/DL (ref 6.3–8.2)
PROT UR QL STRIP: NEGATIVE
RBC # BLD AUTO: 4.92 10*6/MM3 (ref 4.7–6.1)
SAO2 % BLDCOA: 98.9 %
SODIUM BLD-SCNC: 146 MMOL/L (ref 137–145)
SP GR UR STRIP: 1.01 (ref 1–1.03)
TRICYCLICS UR QL SCN: NEGATIVE
UROBILINOGEN UR QL STRIP: NORMAL
WBC NRBC COR # BLD: 6.04 10*3/MM3 (ref 4.8–10.8)
WHOLE BLOOD HOLD SPECIMEN: NORMAL
WHOLE BLOOD HOLD SPECIMEN: NORMAL

## 2017-10-10 PROCEDURE — 70450 CT HEAD/BRAIN W/O DYE: CPT

## 2017-10-10 PROCEDURE — 81003 URINALYSIS AUTO W/O SCOPE: CPT | Performed by: NURSE PRACTITIONER

## 2017-10-10 PROCEDURE — 99284 EMERGENCY DEPT VISIT MOD MDM: CPT

## 2017-10-10 PROCEDURE — 82962 GLUCOSE BLOOD TEST: CPT

## 2017-10-10 PROCEDURE — 83050 HGB METHEMOGLOBIN QUAN: CPT

## 2017-10-10 PROCEDURE — 82375 ASSAY CARBOXYHB QUANT: CPT

## 2017-10-10 PROCEDURE — 36600 WITHDRAWAL OF ARTERIAL BLOOD: CPT

## 2017-10-10 PROCEDURE — 80306 DRUG TEST PRSMV INSTRMNT: CPT | Performed by: NURSE PRACTITIONER

## 2017-10-10 PROCEDURE — 80053 COMPREHEN METABOLIC PANEL: CPT | Performed by: NURSE PRACTITIONER

## 2017-10-10 PROCEDURE — 96360 HYDRATION IV INFUSION INIT: CPT

## 2017-10-10 PROCEDURE — 85025 COMPLETE CBC W/AUTO DIFF WBC: CPT | Performed by: NURSE PRACTITIONER

## 2017-10-10 PROCEDURE — 82805 BLOOD GASES W/O2 SATURATION: CPT

## 2017-10-10 RX ORDER — SODIUM CHLORIDE 0.9 % (FLUSH) 0.9 %
10 SYRINGE (ML) INJECTION AS NEEDED
Status: DISCONTINUED | OUTPATIENT
Start: 2017-10-10 | End: 2017-10-10 | Stop reason: HOSPADM

## 2017-10-10 RX ADMIN — SODIUM CHLORIDE 1000 ML: 9 INJECTION, SOLUTION INTRAVENOUS at 16:25

## 2017-10-10 NOTE — ED PROVIDER NOTES
"Subjective   History of Present Illness  This is a 22-year-old male patient who comes in today complaining of near syncope and \"out of body experiences\"that started yesterday.  He states that there is a period of 6 hours where he was what he believes to be unconscious at times not remembering what happened.  He states he was awake but he does not recall events during the day at different times during the day.  He stated the longest plan of time where he had a not remembering was 6 hours.  He denies any drugs or alcohol use.  He does have a history of PTSD, depression, anxiety.  He states that he denies any homicidal or suicidal ideations.  He also reports that he was recently at [facility] and left AGAINST MEDICAL ADVICE 2 weeks ago.  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  He denies any recent weight loss or weight gain.  He denies being on any current medications.  He denies any fever chills nausea or vomiting.  Review of Systems   Constitutional: Negative for activity change, appetite change, chills, diaphoresis, fatigue and fever.   HENT: Negative.    Eyes: Negative.    Respiratory: Negative.    Cardiovascular: Negative.    Gastrointestinal: Negative.    Genitourinary: Negative.    Skin: Negative.    Neurological: Positive for dizziness, weakness and light-headedness. Negative for tremors, seizures, syncope, facial asymmetry, speech difficulty, numbness and headaches.   Psychiatric/Behavioral: Positive for decreased concentration and dysphoric mood. Negative for agitation, behavioral problems, confusion, hallucinations, self-injury, sleep disturbance and suicidal ideas. The patient is not nervous/anxious.    All other systems reviewed and are negative.      Past Medical History:   Diagnosis Date   • ADHD (attention deficit hyperactivity disorder)    • Depression    • PTSD " (post-traumatic stress disorder)    • Suicide attempt    • UTI (urinary tract infection)        No Known Allergies    Past Surgical History:   Procedure Laterality Date   • APPENDECTOMY  2013   • EYE SURGERY Bilateral     as a child       Family History   Problem Relation Age of Onset   • Alcohol abuse Father    • Suicide Attempts Brother    • Alcohol abuse Brother    • Drug abuse Paternal Aunt    • Suicide Attempts Maternal Grandmother    • Drug abuse Cousin    • Lung cancer Paternal Grandmother        Social History     Social History   • Marital status:      Spouse name: N/A   • Number of children: N/A   • Years of education: N/A     Social History Main Topics   • Smoking status: Never Smoker   • Smokeless tobacco: None   • Alcohol use No   • Drug use: No   • Sexual activity: Defer     Other Topics Concern   • None     Social History Narrative           Objective   Physical Exam   Constitutional: He appears well-developed and well-nourished.   Nursing note and vitals reviewed.  GEN: No acute distress  Head: Normocephalic, atraumatic  Eyes: Pupils equal round reactive to light  ENT: Posterior pharynx normal in appearance, oral mucosa is moist  Chest: Nontender to palpation  Cardiovascular: Regular rate  Lungs: Clear to auscultation bilaterally  Abdomen: Soft, nontender, nondistended, no peritoneal signs  Extremities: No edema, normal appearance  Neuro: GCS 15  Psych: Mood and affect are appropriate      Procedures         ED Course  ED Course                  MDM  Number of Diagnoses or Management Options  Diagnosis management comments: I suspect this could be related to his psychiatric problems with depression that he has had over the past few months have been acute.  However, he denies being any worse to press today and denies any homicidal or suicidal ideations.  Another differential diagnosis would have to be seizure disorder, however he has no history of seizure disorder.  He denies being on any  current medications or any drug or alcohol use.  He could also be hyperventilating due to his anxiety disorder so we will get an ABG to assess for this, also a CT scan of his head, CBC, CMP and urine and urine drug screen.       Amount and/or Complexity of Data Reviewed  Clinical lab tests: ordered and reviewed  Tests in the radiology section of CPT®: ordered and reviewed    Risk of Complications, Morbidity, and/or Mortality  Presenting problems: moderate  Diagnostic procedures: high  Management options: moderate        Final diagnoses:   Postural dizziness with presyncope            Amber Cutler, APRGABINO  10/10/17 [id number]

## 2019-04-12 ENCOUNTER — OFFICE VISIT (OUTPATIENT)
Dept: INTERNAL MEDICINE | Facility: CLINIC | Age: 24
End: 2019-04-12

## 2019-04-12 VITALS
OXYGEN SATURATION: 100 % | SYSTOLIC BLOOD PRESSURE: 129 MMHG | BODY MASS INDEX: 26.77 KG/M2 | WEIGHT: 202 LBS | HEIGHT: 73 IN | DIASTOLIC BLOOD PRESSURE: 78 MMHG | HEART RATE: 78 BPM | TEMPERATURE: 97.6 F

## 2019-04-12 DIAGNOSIS — Z00.00 ENCOUNTER FOR PREVENTIVE HEALTH EXAMINATION: Primary | ICD-10-CM

## 2019-04-12 DIAGNOSIS — IMO0001 LOW SEXUAL DESIRE DISORDER: ICD-10-CM

## 2019-04-12 DIAGNOSIS — Z23 NEED FOR TETANUS BOOSTER: ICD-10-CM

## 2019-04-12 PROBLEM — A74.9 CHLAMYDIA: Status: RESOLVED | Noted: 2017-01-04 | Resolved: 2019-04-12

## 2019-04-12 PROCEDURE — 90715 TDAP VACCINE 7 YRS/> IM: CPT | Performed by: INTERNAL MEDICINE

## 2019-04-12 PROCEDURE — 99395 PREV VISIT EST AGE 18-39: CPT | Performed by: INTERNAL MEDICINE

## 2019-04-12 PROCEDURE — 90471 IMMUNIZATION ADMIN: CPT | Performed by: INTERNAL MEDICINE

## 2019-04-12 NOTE — PROGRESS NOTES
Chief Complaint   Patient presents with   • Annual Exam     here for physical/labs       Subjective     History of Present Illness   Duy Thompson is a 23 y.o. male presenting for annual physical.  Preventive health maintenance was reviewed and discussed today. Vaccines were updated.     Patient shares his urine smells like honey.  Concerned about low testosterone levels. Currently noticing decreased sexual drive/ interest over the last 8 months.     The following portions of the patient's history were reviewed and updated as appropriate: allergies, current medications, past family history, past medical history, past social history, past surgical history and problem list.    Review of Systems   Constitutional: Negative for chills, fatigue and fever.   HENT: Negative for congestion, ear pain, rhinorrhea, sinus pressure and sore throat.    Eyes: Negative for visual disturbance.   Respiratory: Negative for cough, chest tightness, shortness of breath and wheezing.    Cardiovascular: Negative for chest pain, palpitations and leg swelling.   Gastrointestinal: Negative for abdominal pain, blood in stool, constipation, diarrhea, nausea and vomiting.   Endocrine: Negative for polydipsia and polyuria.   Genitourinary: Negative for dysuria and hematuria.   Musculoskeletal: Negative for arthralgias and back pain.   Skin: Negative for rash.   Neurological: Negative for dizziness, light-headedness, numbness and headaches.   Psychiatric/Behavioral: Negative for dysphoric mood and sleep disturbance. The patient is not nervous/anxious.        No Known Allergies    Past Medical History:   Diagnosis Date   • ADHD (attention deficit hyperactivity disorder)    • Depression    • PTSD (post-traumatic stress disorder)    • Suicide attempt (CMS/[facility])    • UTI (urinary tract infection)        Social History     Socioeconomic History   • Marital status:      Spouse name: Not on file   • Number of children: Not on file   • Years of  "education: Not on file   • Highest education level: Not on file   Tobacco Use   • Smoking status: Never Smoker   Substance and Sexual Activity   • Alcohol use: No   • Drug use: No   • Sexual activity: Defer        Past Surgical History:   Procedure Laterality Date   • APPENDECTOMY  2013   • EYE SURGERY Bilateral     as a child       Family History   Problem Relation Age of Onset   • Alcohol abuse Father    • Suicide Attempts Brother    • Alcohol abuse Brother    • Drug abuse Paternal Aunt    • Suicide Attempts Maternal Grandmother    • Drug abuse Cousin    • Lung cancer Paternal Grandmother        No current outpatient medications on file.    Current Facility-Administered Medications:   •  cefTRIAXone (ROCEPHIN) injection 1 g, 1 g, Intramuscular, Once, Devyn cMelroy,     Objective   /78   Pulse 78   Temp 97.6 °F (36.4 °C)   Ht 185.4 cm (73\")   Wt 91.6 kg (202 lb)   SpO2 100%   BMI 26.65 kg/m²     Physical Exam   Constitutional: He is oriented to person, place, and time. He appears well-developed and well-nourished.   HENT:   Head: Normocephalic and atraumatic.   Right Ear: External ear normal.   Left Ear: External ear normal.   Nose: Nose normal.   Mouth/Throat: Oropharynx is clear and moist. No oropharyngeal exudate.   Eyes: EOM are normal. Pupils are equal, round, and reactive to light. Right eye exhibits no discharge. No scleral icterus.   Neck: Normal range of motion. Neck supple. No JVD present. No thyromegaly present.   Cardiovascular: Normal rate, regular rhythm, normal heart sounds and intact distal pulses. Exam reveals no friction rub.   No murmur heard.  Pulmonary/Chest: Effort normal and breath sounds normal. No stridor. No respiratory distress. He has no wheezes.   Abdominal: Soft. Bowel sounds are normal. He exhibits no distension. There is no tenderness. There is no guarding.   Genitourinary:   Genitourinary Comments: Deferred   Musculoskeletal: Normal range of motion. He exhibits no " edema or tenderness.   Lymphadenopathy:     He has no cervical adenopathy.   Neurological: He is alert and oriented to person, place, and time. No cranial nerve deficit.   Skin: Skin is warm and dry. No rash noted.   Psychiatric: He has a normal mood and affect. His behavior is normal. Judgment and thought content normal.   Nursing note and vitals reviewed.      Assessment/Plan   Duy was seen today for annual exam.    Diagnoses and all orders for this visit:    Encounter for preventive health examination  -     Comprehensive Metabolic Panel  -     CBC & Differential  -     Lipid Panel  -     Hemoglobin A1c  -     TSH  -     Testosterone, Free, Total    Low sexual desire disorder  -     Hemoglobin A1c  -     TSH  -     Testosterone, Free, Total    Need for tetanus booster  -     Tdap Vaccine Greater Than or Equal To 6yo IM          Discussion Summary:  Patient is a 23 y.o. male presenting for annual physical    1. Preventive Health Maintenance  - Baseline labs are up-to-date or ordered per above.  - Vaccines reviewed and updated  - Preventive health measures were discussed including: healthy diet with increase in fruits and vegetables, regular exercise at least 3 times a week, safe sex practices, avoidance of drugs, tobacco, and alcohol, and regular seatbelt use.    2.  Decreased sexual desire  -Check labs.    3.  Needs tetanus shot  -Given today.      Follow up:  No Follow-up on file.     Patient Instructions:  Patient instructions were provided.

## 2019-04-12 NOTE — PATIENT INSTRUCTIONS
Health Maintenance, Male  A healthy lifestyle and preventive care is important for your health and wellness. Ask your health care provider about what schedule of regular examinations is right for you.  What should I know about weight and diet?  Eat a Healthy Diet  · Eat plenty of vegetables, fruits, whole grains, low-fat dairy products, and lean protein.  · Do not eat a lot of foods high in solid fats, added sugars, or salt.    Maintain a Healthy Weight  Regular exercise can help you achieve or maintain a healthy weight. You should:  · Do at least 150 minutes of exercise each week. The exercise should increase your heart rate and make you sweat (moderate-intensity exercise).  · Do strength-training exercises at least twice a week.    Watch Your Levels of Cholesterol and Blood Lipids  · Have your blood tested for lipids and cholesterol every 5 years starting at 35 years of age. If you are at high risk for heart disease, you should start having your blood tested when you are 20 years old. You may need to have your cholesterol levels checked more often if:  ? Your lipid or cholesterol levels are high.  ? You are older than 50 years of age.  ? You are at high risk for heart disease.    What should I know about cancer screening?  Many types of cancers can be detected early and may often be prevented.  Lung Cancer  · You should be screened every year for lung cancer if:  ? You are a current smoker who has smoked for at least 30 years.  ? You are a former smoker who has quit within the past 15 years.  · Talk to your health care provider about your screening options, when you should start screening, and how often you should be screened.    Colorectal Cancer  · Routine colorectal cancer screening usually begins at 50 years of age and should be repeated every 5-10 years until you are 75 years old. You may need to be screened more often if early forms of precancerous polyps or small growths are found. Your health care provider  may recommend screening at an earlier age if you have risk factors for colon cancer.  · Your health care provider may recommend using home test kits to check for hidden blood in the stool.  · A small camera at the end of a tube can be used to examine your colon (sigmoidoscopy or colonoscopy). This checks for the earliest forms of colorectal cancer.    Prostate and Testicular Cancer  · Depending on your age and overall health, your health care provider may do certain tests to screen for prostate and testicular cancer.  · Talk to your health care provider about any symptoms or concerns you have about testicular or prostate cancer.    Skin Cancer  · Check your skin from head to toe regularly.  · Tell your health care provider about any new moles or changes in moles, especially if:  ? There is a change in a mole’s size, shape, or color.  ? You have a mole that is larger than a pencil eraser.  · Always use sunscreen. Apply sunscreen liberally and repeat throughout the day.  · Protect yourself by wearing long sleeves, pants, a wide-brimmed hat, and sunglasses when outside.    What should I know about heart disease, diabetes, and high blood pressure?  · If you are 18-39 years of age, have your blood pressure checked every 3-5 years. If you are 40 years of age or older, have your blood pressure checked every year. You should have your blood pressure measured twice--once when you are at a hospital or clinic, and once when you are not at a hospital or clinic. Record the average of the two measurements. To check your blood pressure when you are not at a hospital or clinic, you can use:  ? An automated blood pressure machine at a pharmacy.  ? A home blood pressure monitor.  · Talk to your health care provider about your target blood pressure.  · If you are between 45-79 years old, ask your health care provider if you should take aspirin to prevent heart disease.  · Have regular diabetes screenings by checking your fasting blood  sugar level.  ? If you are at a normal weight and have a low risk for diabetes, have this test once every three years after the age of 45.  ? If you are overweight and have a high risk for diabetes, consider being tested at a younger age or more often.  · A one-time screening for abdominal aortic aneurysm (AAA) by ultrasound is recommended for men aged 65-75 years who are current or former smokers.  What should I know about preventing infection?  Hepatitis B  If you have a higher risk for hepatitis B, you should be screened for this virus. Talk with your health care provider to find out if you are at risk for hepatitis B infection.  Hepatitis C  Blood testing is recommended for:  · Everyone born from 1945 through 1965.  · Anyone with known risk factors for hepatitis C.    Sexually Transmitted Diseases (STDs)  · You should be screened each year for STDs including gonorrhea and chlamydia if:  ? You are sexually active and are younger than 24 years of age.  ? You are older than 24 years of age and your health care provider tells you that you are at risk for this type of infection.  ? Your sexual activity has changed since you were last screened and you are at an increased risk for chlamydia or gonorrhea. Ask your health care provider if you are at risk.  · Talk with your health care provider about whether you are at high risk of being infected with HIV. Your health care provider may recommend a prescription medicine to help prevent HIV infection.    What else can I do?  · Schedule regular health, dental, and eye exams.  · Stay current with your vaccines (immunizations).  · Do not use any tobacco products, such as cigarettes, chewing tobacco, and e-cigarettes. If you need help quitting, ask your health care provider.  · Limit alcohol intake to no more than 2 drinks per day. One drink equals 12 ounces of beer, 5 ounces of wine, or 1½ ounces of hard liquor.  · Do not use street drugs.  · Do not share needles.  · Ask your  health care provider for help if you need support or information about quitting drugs.  · Tell your health care provider if you often feel depressed.  · Tell your health care provider if you have ever been abused or do not feel safe at home.  This information is not intended to replace advice given to you by your health care provider. Make sure you discuss any questions you have with your health care provider.  Document Released: 06/15/2009 Document Revised: 08/16/2017 Document Reviewed: 09/20/2016  [employer] Interactive Patient Education © 2019 [employer]

## 2019-04-19 LAB
ALBUMIN SERPL-MCNC: 4.4 G/DL (ref 3.5–5)
ALBUMIN/GLOB SERPL: 2 G/DL (ref 1–2)
ALP SERPL-CCNC: 94 U/L (ref 38–126)
ALT SERPL-CCNC: 64 U/L (ref 13–69)
AST SERPL-CCNC: 39 U/L (ref 15–46)
BASOPHILS # BLD AUTO: 0.09 10*3/MM3 (ref 0–0.2)
BASOPHILS NFR BLD AUTO: 1.3 % (ref 0–1.5)
BILIRUB SERPL-MCNC: 1.2 MG/DL (ref 0.2–1.3)
BUN SERPL-MCNC: 10 MG/DL (ref 7–20)
BUN/CREAT SERPL: 9.1 (ref 6.3–21.9)
CALCIUM SERPL-MCNC: 9.5 MG/DL (ref 8.4–10.2)
CHLORIDE SERPL-SCNC: 100 MMOL/L (ref 98–107)
CHOLEST SERPL-MCNC: 184 MG/DL (ref 0–199)
CO2 SERPL-SCNC: 29 MMOL/L (ref 26–30)
CREAT SERPL-MCNC: 1.1 MG/DL (ref 0.6–1.3)
EOSINOPHIL # BLD AUTO: 0.17 10*3/MM3 (ref 0–0.4)
EOSINOPHIL NFR BLD AUTO: 2.5 % (ref 0.3–6.2)
ERYTHROCYTE [DISTWIDTH] IN BLOOD BY AUTOMATED COUNT: 12.5 % (ref 12.3–15.4)
GLOBULIN SER CALC-MCNC: 2.2 GM/DL
GLUCOSE SERPL-MCNC: 77 MG/DL (ref 74–98)
HBA1C MFR BLD: 4.9 % (ref 4.8–5.6)
HCT VFR BLD AUTO: 42.7 % (ref 37.5–51)
HDLC SERPL-MCNC: 26 MG/DL (ref 40–60)
HGB BLD-MCNC: 14.7 G/DL (ref 13–17.7)
IMM GRANULOCYTES # BLD AUTO: 0.01 10*3/MM3 (ref 0–0.05)
IMM GRANULOCYTES NFR BLD AUTO: 0.1 % (ref 0–0.5)
LDLC SERPL CALC-MCNC: 129 MG/DL (ref 0–99)
LYMPHOCYTES # BLD AUTO: 2.45 10*3/MM3 (ref 0.7–3.1)
LYMPHOCYTES NFR BLD AUTO: 36.7 % (ref 19.6–45.3)
MCH RBC QN AUTO: 31.3 PG (ref 26.6–33)
MCHC RBC AUTO-ENTMCNC: 34.4 G/DL (ref 31.5–35.7)
MCV RBC AUTO: 91 FL (ref 79–97)
MONOCYTES # BLD AUTO: 0.48 10*3/MM3 (ref 0.1–0.9)
MONOCYTES NFR BLD AUTO: 7.2 % (ref 5–12)
NEUTROPHILS # BLD AUTO: 3.48 10*3/MM3 (ref 1.4–7)
NEUTROPHILS NFR BLD AUTO: 52.2 % (ref 42.7–76)
NRBC BLD AUTO-RTO: 0 /100 WBC (ref 0–0)
PLATELET # BLD AUTO: 235 10*3/MM3 (ref 140–450)
POTASSIUM SERPL-SCNC: 3.7 MMOL/L (ref 3.5–5.1)
PROT SERPL-MCNC: 6.6 G/DL (ref 6.3–8.2)
RBC # BLD AUTO: 4.69 10*6/MM3 (ref 4.14–5.8)
SODIUM SERPL-SCNC: 139 MMOL/L (ref 137–145)
TESTOST FREE SERPL-MCNC: 11 PG/ML (ref 9.3–26.5)
TESTOST SERPL-MCNC: 440 NG/DL (ref 264–916)
TRIGL SERPL-MCNC: 146 MG/DL
TSH SERPL DL<=0.005 MIU/L-ACNC: 1.21 MIU/ML (ref 0.47–4.68)
VLDLC SERPL CALC-MCNC: 29.2 MG/DL
WBC # BLD AUTO: 6.68 10*3/MM3 (ref 3.4–10.8)

## 2019-04-19 NOTE — PROGRESS NOTES
Let the patient know his labs are all in normal range.  Cholesterol is borderline high, better diet control is encouraged. He should avoid fried and fatty foods.

## 2019-04-23 ENCOUNTER — TELEPHONE (OUTPATIENT)
Dept: INTERNAL MEDICINE | Facility: CLINIC | Age: 24
End: 2019-04-23

## 2019-05-03 ENCOUNTER — TELEPHONE (OUTPATIENT)
Dept: INTERNAL MEDICINE | Facility: CLINIC | Age: 24
End: 2019-05-03

## 2019-05-03 NOTE — TELEPHONE ENCOUNTER
Patient called and was wondering about his blood type.  Could you give him a call when you get a chance.  Phone number verified.  Thank you.

## 2019-05-03 NOTE — TELEPHONE ENCOUNTER
Patient notified that blood type is generally not covered under insurance.  I told him if he would like to have this done he could pay cash or he could donate blood.

## 2019-09-26 ENCOUNTER — OFFICE VISIT (OUTPATIENT)
Dept: INTERNAL MEDICINE | Facility: CLINIC | Age: 24
End: 2019-09-26

## 2019-09-26 VITALS
WEIGHT: 165 LBS | SYSTOLIC BLOOD PRESSURE: 125 MMHG | HEART RATE: 86 BPM | HEIGHT: 73 IN | BODY MASS INDEX: 21.87 KG/M2 | OXYGEN SATURATION: 100 % | DIASTOLIC BLOOD PRESSURE: 78 MMHG | TEMPERATURE: 97.4 F

## 2019-09-26 DIAGNOSIS — F33.1 MODERATE EPISODE OF RECURRENT MAJOR DEPRESSIVE DISORDER (HCC): Primary | ICD-10-CM

## 2019-09-26 PROCEDURE — 99213 OFFICE O/P EST LOW 20 MIN: CPT | Performed by: INTERNAL MEDICINE

## 2019-09-30 NOTE — PROGRESS NOTES
Chief Complaint   Patient presents with   • Follow-up     depression       Subjective     History of Present Illness   Duy Thompson is a 23 y.o. male presenting for follow up with concerns about significant depression.  Patient was advised from his workplace to present for medical care.  He shares that he prefers not to be on medications as he has tried multiple medications including SSRIs, SNRIs, and Wellbutrin without much benefit.  He has not seen a psychiatrist.  He is open to trying counseling.  As far as his depression, he has been feeling down and having poor motivation to accomplish tasks.  He was not much more specific.    The following portions of the patient's history were reviewed and updated as appropriate: allergies, current medications, past family history, past medical history, past social history, past surgical history and problem list.    Review of Systems   Constitutional: Negative for chills, fatigue and fever.   HENT: Negative for congestion, ear pain, rhinorrhea, sinus pressure and sore throat.    Eyes: Negative for visual disturbance.   Respiratory: Negative for cough, chest tightness, shortness of breath and wheezing.    Cardiovascular: Negative for chest pain, palpitations and leg swelling.   Gastrointestinal: Negative for abdominal pain, blood in stool, constipation, diarrhea, nausea and vomiting.   Endocrine: Negative for polydipsia and polyuria.   Genitourinary: Negative for dysuria and hematuria.   Musculoskeletal: Negative for arthralgias and back pain.   Skin: Negative for rash.   Neurological: Negative for dizziness, light-headedness, numbness and headaches.   Psychiatric/Behavioral: Positive for dysphoric mood. Negative for sleep disturbance. The patient is not nervous/anxious.        No Known Allergies    Past Medical History:   Diagnosis Date   • ADHD (attention deficit hyperactivity disorder)    • Depression    • PTSD (post-traumatic stress disorder)    • Suicide attempt  "(CMS/[facility])    • UTI (urinary tract infection)        Social History     Socioeconomic History   • Marital status:      Spouse name: Not on file   • Number of children: Not on file   • Years of education: Not on file   • Highest education level: Not on file   Tobacco Use   • Smoking status: Never Smoker   Substance and Sexual Activity   • Alcohol use: No   • Drug use: No   • Sexual activity: Defer        Past Surgical History:   Procedure Laterality Date   • APPENDECTOMY  2013   • EYE SURGERY Bilateral     as a child       Family History   Problem Relation Age of Onset   • Alcohol abuse Father    • Suicide Attempts Brother    • Alcohol abuse Brother    • Drug abuse Paternal Aunt    • Suicide Attempts Maternal Grandmother    • Drug abuse Cousin    • Lung cancer Paternal Grandmother        No current outpatient medications on file.    Current Facility-Administered Medications:   •  cefTRIAXone (ROCEPHIN) injection 1 g, 1 g, Intramuscular, Once, Devyn Mcelroy DO    Objective   /78   Pulse 86   Temp 97.4 °F (36.3 °C)   Ht 185.4 cm (73\")   Wt 74.8 kg (165 lb)   SpO2 100%   BMI 21.77 kg/m²     Physical Exam   Constitutional: He is oriented to person, place, and time. He appears well-developed and well-nourished.   HENT:   Head: Normocephalic and atraumatic.   Eyes: Conjunctivae are normal.   Neck: Normal range of motion. Neck supple.   Pulmonary/Chest: Effort normal.   Musculoskeletal: Normal range of motion.   Neurological: He is alert and oriented to person, place, and time.   Skin: No rash noted.   Psychiatric: He has a normal mood and affect. His behavior is normal.   Nursing note and vitals reviewed.      Assessment/Plan   Duy was seen today for follow-up.    Diagnoses and all orders for this visit:    Moderate episode of recurrent major depressive disorder (CMS/[facility])      Discussion Summary:  Patient is a 23 y.o. male presenting for follow up with major depressive disorder.  Patient says " not been able to tolerate multiple psychiatric medications.  I did offer considering trying different types of SSRIs however patient prefers to avoid medications if at all possible.  I shared the benefit of psychiatric counseling and he was agreeable to trying this.  A list of local providers was given to the patient for self-referral.    Follow up:  No Follow-up on file.

## 2019-10-16 ENCOUNTER — TELEPHONE (OUTPATIENT)
Dept: INTERNAL MEDICINE | Facility: CLINIC | Age: 24
End: 2019-10-16

## 2019-10-21 ENCOUNTER — TELEPHONE (OUTPATIENT)
Dept: INTERNAL MEDICINE | Facility: CLINIC | Age: 24
End: 2019-10-21

## 2019-10-21 NOTE — TELEPHONE ENCOUNTER
Patient called regarding faxed info, states company hasn't received it yet, but if not they will fire him. He'd like a callback when possible.

## 2019-10-30 ENCOUNTER — TELEPHONE (OUTPATIENT)
Dept: INTERNAL MEDICINE | Facility: CLINIC | Age: 24
End: 2019-10-30

## 2019-10-30 NOTE — TELEPHONE ENCOUNTER
Patient called asking about some information that he stated was supposed to be sent to his employer via email.  He stated that his employer was being difficult about needing the information by tomorrow.  He did not give any further details, only stated that you would know what he was talking about.  I told him that I would send you a message and you could contact him if you had any questions.  Ph: [phone]

## 2019-12-11 ENCOUNTER — RESULTS ENCOUNTER (OUTPATIENT)
Dept: INTERNAL MEDICINE | Facility: CLINIC | Age: 24
End: 2019-12-11

## 2019-12-11 ENCOUNTER — OFFICE VISIT (OUTPATIENT)
Dept: INTERNAL MEDICINE | Facility: CLINIC | Age: 24
End: 2019-12-11

## 2019-12-11 VITALS
HEIGHT: 73 IN | BODY MASS INDEX: 21.15 KG/M2 | TEMPERATURE: 98.5 F | DIASTOLIC BLOOD PRESSURE: 65 MMHG | SYSTOLIC BLOOD PRESSURE: 114 MMHG | WEIGHT: 159.6 LBS | OXYGEN SATURATION: 100 % | RESPIRATION RATE: 16 BRPM | HEART RATE: 81 BPM

## 2019-12-11 DIAGNOSIS — A74.9 CHLAMYDIA: Primary | ICD-10-CM

## 2019-12-11 DIAGNOSIS — A74.9 CHLAMYDIA: ICD-10-CM

## 2019-12-11 PROCEDURE — 99213 OFFICE O/P EST LOW 20 MIN: CPT | Performed by: INTERNAL MEDICINE

## 2019-12-11 PROCEDURE — 96372 THER/PROPH/DIAG INJ SC/IM: CPT | Performed by: INTERNAL MEDICINE

## 2019-12-11 RX ORDER — AZITHROMYCIN 500 MG/1
1000 TABLET, FILM COATED ORAL DAILY
Qty: 2 TABLET | Refills: 0 | Status: SHIPPED | OUTPATIENT
Start: 2019-12-11 | End: 2022-05-18

## 2019-12-11 RX ORDER — CEFTRIAXONE 1 G/1
1 INJECTION, POWDER, FOR SOLUTION INTRAMUSCULAR; INTRAVENOUS ONCE
Status: COMPLETED | OUTPATIENT
Start: 2019-12-11 | End: 2019-12-11

## 2019-12-11 RX ADMIN — CEFTRIAXONE 1 G: 1 INJECTION, POWDER, FOR SOLUTION INTRAMUSCULAR; INTRAVENOUS at 12:01

## 2019-12-11 NOTE — PROGRESS NOTES
Chief Complaint   Patient presents with   • Exposure to STD     condom broke with a partner who was positive.       Subjective     History of Present Illness   Duy Thompson is a 24 y.o. male with history of multiple sexual partners presenting for follow up with concerns of recurrence of chlamydia which he has had in the past.  He shares a week ago he was sexually involved with a partner, condom broke.  He developed dysuria and burning 2 days after the encounter.      The following portions of the patient's history were reviewed and updated as appropriate: allergies, current medications, past family history, past medical history, past social history, past surgical history and problem list.    Review of Systems   Constitutional: Negative for chills, fatigue and fever.   HENT: Negative for congestion, ear pain, rhinorrhea, sinus pressure and sore throat.    Eyes: Negative for visual disturbance.   Respiratory: Negative for cough, chest tightness, shortness of breath and wheezing.    Cardiovascular: Negative for chest pain, palpitations and leg swelling.   Gastrointestinal: Negative for abdominal pain, blood in stool, constipation, diarrhea, nausea and vomiting.   Endocrine: Negative for polydipsia and polyuria.   Genitourinary: Positive for dysuria and penile pain. Negative for discharge, hematuria, scrotal swelling and testicular pain.   Musculoskeletal: Negative for arthralgias and back pain.   Skin: Negative for rash.   Neurological: Negative for dizziness, light-headedness, numbness and headaches.   Psychiatric/Behavioral: Negative for dysphoric mood and sleep disturbance. The patient is not nervous/anxious.        No Known Allergies    Past Medical History:   Diagnosis Date   • ADHD (attention deficit hyperactivity disorder)    • Depression    • PTSD (post-traumatic stress disorder)    • Suicide attempt (CMS/[facility])    • UTI (urinary tract infection)        Social History     Socioeconomic History   • Marital  "status:      Spouse name: Not on file   • Number of children: Not on file   • Years of education: Not on file   • Highest education level: Not on file   Tobacco Use   • Smoking status: Never Smoker   • Smokeless tobacco: Never Used   Substance and Sexual Activity   • Alcohol use: No   • Drug use: No   • Sexual activity: Defer        Past Surgical History:   Procedure Laterality Date   • APPENDECTOMY  2013   • EYE SURGERY Bilateral     as a child       Family History   Problem Relation Age of Onset   • Alcohol abuse Father    • Suicide Attempts Brother    • Alcohol abuse Brother    • Drug abuse Paternal Aunt    • Suicide Attempts Maternal Grandmother    • Drug abuse Cousin    • Lung cancer Paternal Grandmother          Current Outpatient Medications:   •  azithromycin (ZITHROMAX) 500 MG tablet, Take 2 tablets by mouth Daily., Disp: 2 tablet, Rfl: 0  No current facility-administered medications for this visit.     Objective   /65   Pulse 81   Temp 98.5 °F (36.9 °C)   Resp 16   Ht 185.4 cm (73\")   Wt 72.4 kg (159 lb 9.6 oz)   SpO2 100%   BMI 21.06 kg/m²     Physical Exam   Constitutional: He is oriented to person, place, and time. He appears well-developed and well-nourished.   HENT:   Head: Normocephalic and atraumatic.   Eyes: Conjunctivae are normal.   Neck: Normal range of motion. Neck supple.   Pulmonary/Chest: Effort normal.   Musculoskeletal: Normal range of motion.   Neurological: He is alert and oriented to person, place, and time.   Skin: No rash noted.   Psychiatric: He has a normal mood and affect. His behavior is normal.   Nursing note and vitals reviewed.      Assessment/Plan   Duy was seen today for exposure to std.    Diagnoses and all orders for this visit:    Chlamydia  -     STI/STD PANEL URINE ([facility]) - Urine, Urine, Clean Catch; Future  -     azithromycin (ZITHROMAX) 500 MG tablet; Take 2 tablets by mouth Daily.  -     cefTRIAXone (ROCEPHIN) injection 1 " g          Discussion Summary:  Patient is a 24 y.o. male presenting for follow up.    Chlamydia infection  - start azithromycin and rocephin inj.    - confirm diagnosis with urine testing.     Follow up:  Return if symptoms worsen or fail to improve.

## 2019-12-11 NOTE — PATIENT INSTRUCTIONS
Chlamydia, Male    Chlamydia is an STD (sexually transmitted disease). It is a bacterial infection that spreads through sexual contact (is contagious). Chlamydia can occur in different areas of the body, including the tube that moves urine from the bladder out of the body (urethra), the throat, or the rectum. This condition is not difficult to treat. However, if left untreated, chlamydia can lead to more serious health problems.  What are the causes?  Chlamydia is caused by the bacteria Chlamydia trachomatis. It is passed from an infected partner during sexual activity. Chlamydia can spread through contact with the genitals, mouth, or rectum.  What are the signs or symptoms?  In some cases, there may not be any symptoms for this condition (asymptomatic), especially early in the infection. If symptoms develop, they may include:  · Burning when urinating.  · Urinating frequently.  · Pain or swelling in the testicles.  · Watery, mucus-like discharge from the penis.  · Redness, soreness, and swelling (inflammation) of the rectum.  · Bleeding or discharge from the rectum.  · Abdominal pain.  · Itching, burning, or redness in the eyes, or discharge from the eyes.  How is this diagnosed?  This condition may be diagnosed based on:  · Urine tests.  · Swab tests. Depending on your symptoms, your health care provider may use a cotton swab to collect discharge from your urethra or rectum to test for the bacteria.  How is this treated?  This condition is treated with antibiotic medicines.  Follow these instructions at home:  Medicines  · Take over-the-counter and prescription medicines only as told by your health care provider.  · Take your antibiotic medicine as told by your health care provider. Do not stop taking the antibiotic even if you start to feel better.  Sexual activity  · Tell sexual partners about your infection. This includes any oral, anal, or vaginal sex partners you have had within 60 days of when your symptoms  started. Sexual partners should also be treated, even if they have no signs of the disease.  · Do not have sex until you and your sexual partners have completed treatment and your health care provider says it is okay. If your health care provider prescribed you a single dose treatment, wait 7 days after taking the treatment before having sex.  General instructions  · It is your responsibility to get your test results. Ask your health care provider, or the department performing the test, when your results will be ready.  · Get plenty of rest.  · Eat a healthy, well-balanced diet.  · Drink enough fluids to keep your urine clear or pale yellow.  · Keep all follow-up visits as told by your health care provider. This is important. You may need to be tested for infection again 3 months after treatment.  How is this prevented?  The only sure way to prevent chlamydia is to avoid sexual intercourse. However, you can lower your risk by:  · Using latex condoms correctly every time you have sexual intercourse.  · Not having multiple sexual partners.  · Asking if your sexual partner has been tested for STIs and had negative results.  Contact a health care provider if:  · You develop new symptoms or your symptoms do not get better after completing treatment.  · You have a fever or chills.  · You have pain during sexual intercourse.  · You develop new joint pain or swelling near your joints.  · You have pain or soreness in your testicles.  Get help right away if:  · Your pain gets worse and does not get better with medicine.  · You have abnormal discharge.  · You develop flu-like symptoms, such as night sweats, sore throat, or muscle aches.  Summary  · Chlamydia is an STD (sexually transmitted disease). It is a bacterial infection that spreads (is contagious) through sexual contact.  · This condition is not difficult to treat, however, if left untreated, it can lead to more serious health problems.  · In some cases, there may not  be any symptoms for this condition (asymptomatic).  · This condition is treated with antibiotic medicines.  · Using latex condoms correctly every time you have sexual intercourse can help prevent chlamydia.  This information is not intended to replace advice given to you by your health care provider. Make sure you discuss any questions you have with your health care provider.  Document Released: 12/18/2006 Document Revised: 12/04/2017 Document Reviewed: 12/04/2017  [employer] Interactive Patient Education © 2019 [employer]

## 2019-12-17 DIAGNOSIS — N34.1 NONGONOCOCCAL URETHRITIS DUE TO UREAPLASMA UREALYTICUM: ICD-10-CM

## 2019-12-17 DIAGNOSIS — A49.3 MYCOPLASMA INFECTION: Primary | ICD-10-CM

## 2019-12-17 DIAGNOSIS — N76.0 GARDNERELLA VAGINITIS: ICD-10-CM

## 2019-12-17 DIAGNOSIS — B96.89 GARDNERELLA VAGINITIS: ICD-10-CM

## 2019-12-17 DIAGNOSIS — A49.3 NONGONOCOCCAL URETHRITIS DUE TO UREAPLASMA UREALYTICUM: ICD-10-CM

## 2019-12-17 RX ORDER — CLINDAMYCIN HYDROCHLORIDE 300 MG/1
600 CAPSULE ORAL 3 TIMES DAILY
Qty: 42 CAPSULE | Refills: 0 | Status: SHIPPED | OUTPATIENT
Start: 2019-12-17 | End: 2022-05-18

## 2019-12-17 NOTE — PROGRESS NOTES
STD testing was positive for multiple bacteria Ureaplasmin, mycoplasma, and gardnerella.  I sent in clindamycin.

## 2021-01-12 ENCOUNTER — HOSPITAL ENCOUNTER (EMERGENCY)
Facility: HOSPITAL | Age: 26
Discharge: HOME | End: 2021-01-12
Attending: EMERGENCY MEDICINE
Payer: MEDICAID

## 2021-01-12 VITALS
BODY MASS INDEX: 21.87 KG/M2 | HEIGHT: 73 IN | TEMPERATURE: 98 F | SYSTOLIC BLOOD PRESSURE: 113 MMHG | RESPIRATION RATE: 14 BRPM | WEIGHT: 165 LBS | OXYGEN SATURATION: 96 % | HEART RATE: 79 BPM | DIASTOLIC BLOOD PRESSURE: 79 MMHG

## 2021-01-12 DIAGNOSIS — K04.7 DENTAL ABSCESS: Primary | ICD-10-CM

## 2021-01-12 PROCEDURE — 99282 EMERGENCY DEPT VISIT SF MDM: CPT

## 2021-01-12 RX ORDER — PENICILLIN V POTASSIUM 500 MG/1
500 TABLET ORAL 4 TIMES DAILY
Qty: 40 TABLET | Refills: 0 | Status: SHIPPED | OUTPATIENT
Start: 2021-01-12 | End: 2021-01-22

## 2021-01-12 NOTE — ED PROVIDER NOTES
[address] ENCOUNTER      Pt Name: Jono Aiken  MRN: [id number]  [place] 1995  Date of evaluation: 1/12/2021  Provider: Grace Ty DO    CHIEF COMPLAINT       Chief Complaint   Patient presents with    Oral Swelling         HISTORY OF PRESENT ILLNESS   (Location/Symptom, Timing/Onset, Context/Setting, Quality, Duration, Modifying Factors, Severity)  Note limiting factors. Jono Aiken is a 30 y.o. male who presents to the emergency department with complaint of a tooth infection. Patient reports that he has been dealing with intermittent tooth infections for the past 2 years. States he saw a dentist 2 years ago who told him he needed a root canal.  He did not have insurance so did not follow-up. States about a month ago he started having gum swelling and then found out that he does have insurance. He came to the emergency department for antibiotics as his gums are slightly swollen and painful. Denies any tongue swelling, recent dental work, difficulty breathing or swallowing. Appropriate PPE was used including an eye shield, gloves, N95 mask, surgical mask during the entire patient encounter and exam.  If necessary (pt being intubated or aerosolizing equipment in use) a gown was also used. Nursing Notes were reviewed. PAST MEDICAL HISTORY   History reviewed. No pertinent past medical history. SURGICAL HISTORY     History reviewed. No pertinent surgical history. CURRENT MEDICATIONS       Previous Medications    No medications on file       ALLERGIES     Patient has no known allergies. FAMILY HISTORY     History reviewed. No pertinent family history.        SOCIAL HISTORY       Social History     Socioeconomic History    Marital status: None     Spouse name: None    Number of children: None    Years of education: None    Highest education level: None   Occupational History    None   Social Needs    trismus, clear speech normocephalic, atraumatic, oropharynx moist, nares patent  Neck: normal range of motion, no tenderness, trachea midline, no stridor  Respiratory: normal breath sounds, non labored breathing pattern  Cardiovascular: normal heart rate, normal rhythm  GI: nontender, bowel sounds normal, soft, nondistended, no pulsatile masses  Musculoskeletal: no edema, intact distal pulses, no clubbing, cyanosis. Good range of motion  Back: no tenderness  Integument: warm, dry, no erythema, no rash, < 2 second cap refill  Neurologic: alert and oriented ×3, no focal deficits appreciated    DIAGNOSTIC RESULTS       RADIOLOGY:   Interpretation per the Radiologist below, if available at the time of this note:    No orders to display         LABS:  Labs Reviewed - No data to display    All other labs were within normal range or not returned as of this dictation. EMERGENCY DEPARTMENT COURSE and DIFFERENTIAL DIAGNOSIS/MDM:   Vitals:    Vitals:    01/12/21 0912   BP: 124/83   Pulse: 85   Resp: 14   Temp: 98 °F (36.7 °C)   TempSrc: Oral   SpO2: 98%   Weight: 165 lb (74.8 kg)   Height: 6' 1\" (1.854 m)         MDM  31 y/o M presents to the ER with complaint of some to swelling and pain. Vital signs stable. Physical exam as above. He is alert awake and appears nontoxic. Oropharynx is patent. Uvula midline. No trismus, no tongue swelling. No signs of Evelyne's angina. He appears in no distress at this time. No discernible abscess to I&D. Will give prescription for penicillin V and instructions to follow-up with his dentist as an outpatient. Return precautions given if he has tongue swelling, difficulty breathing or any new or concerning symptoms arise. Patient agrees with discharge plan. CONSULTS:  None      FINAL IMPRESSION      1. Dental abscess          DISPOSITION/PLAN   DISPOSITION Decision To Discharge 01/12/2021 09:19:07 AM      PATIENT REFERRED TO:  No follow-up provider specified.     DISCHARGE MEDICATIONS:  New Prescriptions    PENICILLIN V POTASSIUM (VEETID) 500 MG TABLET    Take 1 tablet by mouth 4 times daily for 10 days          (Please note that portions of this note were completed with a voice recognition program.  Efforts were made to edit the dictations but occasionally words are mis-transcribed.)    Alfredo Pathak DO (electronically signed)  Attending Emergency Physician      Alfredo Pathak DO  01/12/21 0194

## 2021-01-12 NOTE — ED TRIAGE NOTES
Pt arrived to ED with complaints of swollen gums x1 month, denies any pain, reports that nothing has change except he found out he has insurance today so he decided to come to ER.

## 2022-05-18 ENCOUNTER — OFFICE VISIT (OUTPATIENT)
Dept: INTERNAL MEDICINE | Facility: CLINIC | Age: 27
End: 2022-05-18

## 2022-05-18 VITALS
OXYGEN SATURATION: 99 % | BODY MASS INDEX: 23.19 KG/M2 | TEMPERATURE: 98.7 F | HEIGHT: 73 IN | SYSTOLIC BLOOD PRESSURE: 125 MMHG | DIASTOLIC BLOOD PRESSURE: 82 MMHG | WEIGHT: 175 LBS | HEART RATE: 88 BPM

## 2022-05-18 DIAGNOSIS — N62 GYNECOMASTIA: ICD-10-CM

## 2022-05-18 DIAGNOSIS — F45.22 BODY DYSMORPHIC DISORDER WITH MUSCLE DYSMORPHIA AND WITH GOOD OR FAIR INSIGHT: ICD-10-CM

## 2022-05-18 DIAGNOSIS — F39 MOOD DISORDER: ICD-10-CM

## 2022-05-18 DIAGNOSIS — Z00.00 ENCOUNTER FOR PREVENTIVE HEALTH EXAMINATION: Primary | ICD-10-CM

## 2022-05-18 LAB
BILIRUB BLD-MCNC: NEGATIVE MG/DL
CLARITY, POC: CLEAR
COLOR UR: ABNORMAL
EXPIRATION DATE: ABNORMAL
GLUCOSE UR STRIP-MCNC: NEGATIVE MG/DL
KETONES UR QL: NEGATIVE
LEUKOCYTE EST, POC: NEGATIVE
Lab: ABNORMAL
NITRITE UR-MCNC: NEGATIVE MG/ML
PH UR: 6 [PH] (ref 5–8)
PROT UR STRIP-MCNC: ABNORMAL MG/DL
RBC # UR STRIP: NEGATIVE /UL
SP GR UR: 1.02 (ref 1–1.03)
UROBILINOGEN UR QL: NORMAL

## 2022-05-18 PROCEDURE — 99395 PREV VISIT EST AGE 18-39: CPT | Performed by: INTERNAL MEDICINE

## 2022-05-18 PROCEDURE — 81003 URINALYSIS AUTO W/O SCOPE: CPT | Performed by: INTERNAL MEDICINE

## 2022-05-18 RX ORDER — LORATADINE 10 MG/1
10 TABLET ORAL DAILY
COMMUNITY

## 2022-05-18 NOTE — PROGRESS NOTES
Chief Complaint   Patient presents with   • Annual Exam     Including STD testing.   • Fatigue     Pt wishes to have testosterone checked-also has developed some breast fullness       Subjective     History of Present Illness   Duy Thompson is a 26 y.o. male presenting for annual physical.  Preventive health maintenance was reviewed and discussed today. Vaccines were updated.     He understands he has an eating disorder which takes him to extremes of not eating and over eating.  Last year, he had gone down from 210 to 150 lbs in just a couple of months.  He has since been holding in the 170s with his weight. Following this, he started  SARMs and KRV187 steroid like supplements in a 10 week cycle.  Since stopping the supplements, he feels like he has been crashing, feeling tired.  Now off as of Beat[place].  While on the supplements, he felt he had hypersexual thoughts.      Seasonal allergies have been bothering him more than normal.  He has been taking Claritin.         The following portions of the patient's history were reviewed and updated as appropriate: allergies, current medications, past family history, past medical history, past social history, past surgical history and problem list.    Review of Systems   Constitutional: Negative for chills, fatigue and fever.   HENT: Negative for congestion, ear pain, rhinorrhea, sinus pressure and sore throat.    Eyes: Negative for visual disturbance.   Respiratory: Negative for cough, chest tightness, shortness of breath and wheezing.    Cardiovascular: Negative for chest pain, palpitations and leg swelling.   Gastrointestinal: Negative for abdominal pain, blood in stool, constipation, diarrhea, nausea and vomiting.   Endocrine: Negative for polydipsia and polyuria.   Genitourinary: Negative for dysuria and hematuria.   Musculoskeletal: Negative for arthralgias and back pain.   Skin: Negative for rash.   Neurological: Negative for dizziness, light-headedness, numbness  "and headaches.   Psychiatric/Behavioral: Negative for dysphoric mood and sleep disturbance. The patient is not nervous/anxious.        No Known Allergies    Past Medical History:   Diagnosis Date   • ADHD (attention deficit hyperactivity disorder)    • Depression    • PTSD (post-traumatic stress disorder)    • Suicide attempt (HCC)    • UTI (urinary tract infection)        Social History     Socioeconomic History   • Marital status:    Tobacco Use   • Smoking status: Never Smoker   • Smokeless tobacco: Never Used   Substance and Sexual Activity   • Alcohol use: No   • Drug use: No   • Sexual activity: Defer        Past Surgical History:   Procedure Laterality Date   • APPENDECTOMY  2013   • EYE SURGERY Bilateral     as a child       Family History   Problem Relation Age of Onset   • Alcohol abuse Father    • Suicide Attempts Brother    • Alcohol abuse Brother    • Drug abuse Paternal Aunt    • Suicide Attempts Maternal Grandmother    • Drug abuse Cousin    • Lung cancer Paternal Grandmother          Current Outpatient Medications:   •  loratadine (Claritin) 10 MG tablet, Take 10 mg by mouth Daily., Disp: , Rfl:     Objective   /82   Pulse 88   Temp 98.7 °F (37.1 °C)   Ht 185.4 cm (73\")   Wt 79.4 kg (175 lb)   SpO2 99%   BMI 23.09 kg/m²     Physical Exam  Vitals and nursing note reviewed.   Constitutional:       Appearance: Normal appearance. He is well-developed.   HENT:      Head: Normocephalic and atraumatic.      Right Ear: Tympanic membrane and external ear normal.      Left Ear: Tympanic membrane and external ear normal.      Nose: Nose normal. No congestion.      Mouth/Throat:      Mouth: Mucous membranes are moist.      Pharynx: No oropharyngeal exudate.   Eyes:      General: No scleral icterus.        Right eye: No discharge.      Pupils: Pupils are equal, round, and reactive to light.   Neck:      Thyroid: No thyromegaly.      Vascular: No JVD.   Cardiovascular:      Rate and Rhythm: " Normal rate and regular rhythm.      Heart sounds: Normal heart sounds. No murmur heard.    No friction rub.   Pulmonary:      Effort: Pulmonary effort is normal. No respiratory distress.      Breath sounds: Normal breath sounds. No stridor. No wheezing.   Abdominal:      General: Bowel sounds are normal. There is no distension.      Palpations: Abdomen is soft.      Tenderness: There is no abdominal tenderness. There is no guarding.   Genitourinary:     Comments: Deferred  Musculoskeletal:         General: No tenderness. Normal range of motion.      Cervical back: Normal range of motion and neck supple.   Lymphadenopathy:      Cervical: No cervical adenopathy.   Skin:     General: Skin is warm and dry.      Findings: No rash.      Comments: Numerous tatoos   Neurological:      Mental Status: He is alert and oriented to person, place, and time.      Cranial Nerves: No cranial nerve deficit.   Psychiatric:         Mood and Affect: Mood normal.         Behavior: Behavior normal.         Thought Content: Thought content normal.         Assessment & Plan   Diagnoses and all orders for this visit:    1. Encounter for preventive health examination (Primary)  -     CBC & Differential  -     Comprehensive Metabolic Panel  -     Lipid Panel    2. Gynecomastia  -     Testosterone, Free, Total  -     Prolactin  -     TSH    3. 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 Mood disorder (HCC)          Discussion Summary:  Patient is a 26 y.o. male presenting for annual physical    1. Preventive Health Maintenance  - Baseline labs are up-to-date or ordered per above.  - Vaccines reviewed and updated  - Preventive health measures were discussed including: healthy diet with increase in fruits and vegetables, regular exercise at least 3 times a week, safe sex practices, avoidance of drugs, tobacco, and alcohol, and regular seatbelt use.    2.  Mood disorder/body dysmorphic disorder  - Patient  was reassured that his current weight was ideal.  I did not particularly notice concerning signs for gynecomastia on exam.  He has been on medications for mood in the however he prefers to not be on medications at this time.    3.  Gynecomastia  - Rule out with further testing for testosterone, prolactin, and thyroid function        Follow up:  Return in about 6 months (around 11/18/2022) for Next scheduled follow up.     Patient Instructions:  Patient instructions were provided.

## 2022-05-20 LAB
ALBUMIN SERPL-MCNC: 4.5 G/DL (ref 3.5–5.2)
ALBUMIN/GLOB SERPL: 2.3 G/DL
ALP SERPL-CCNC: 69 U/L (ref 39–117)
ALT SERPL-CCNC: 25 U/L (ref 1–41)
AST SERPL-CCNC: 17 U/L (ref 1–40)
BASOPHILS # BLD AUTO: 0.08 10*3/MM3 (ref 0–0.2)
BASOPHILS NFR BLD AUTO: 1.4 % (ref 0–1.5)
BILIRUB SERPL-MCNC: 0.6 MG/DL (ref 0–1.2)
BUN SERPL-MCNC: 11 MG/DL (ref 6–20)
BUN/CREAT SERPL: 10.3 (ref 7–25)
CALCIUM SERPL-MCNC: 9.6 MG/DL (ref 8.6–10.5)
CHLORIDE SERPL-SCNC: 105 MMOL/L (ref 98–107)
CHOLEST SERPL-MCNC: 198 MG/DL (ref 0–200)
CO2 SERPL-SCNC: 24 MMOL/L (ref 22–29)
CREAT SERPL-MCNC: 1.07 MG/DL (ref 0.76–1.27)
EGFRCR SERPLBLD CKD-EPI 2021: 96.9 ML/MIN/1.73
EOSINOPHIL # BLD AUTO: 0.43 10*3/MM3 (ref 0–0.4)
EOSINOPHIL NFR BLD AUTO: 7.3 % (ref 0.3–6.2)
ERYTHROCYTE [DISTWIDTH] IN BLOOD BY AUTOMATED COUNT: 12.5 % (ref 12.3–15.4)
GLOBULIN SER CALC-MCNC: 2 GM/DL
GLUCOSE SERPL-MCNC: 88 MG/DL (ref 65–99)
HCT VFR BLD AUTO: 47.2 % (ref 37.5–51)
HDLC SERPL-MCNC: 34 MG/DL (ref 40–60)
HGB BLD-MCNC: 16.2 G/DL (ref 13–17.7)
IMM GRANULOCYTES # BLD AUTO: 0.01 10*3/MM3 (ref 0–0.05)
IMM GRANULOCYTES NFR BLD AUTO: 0.2 % (ref 0–0.5)
LDLC SERPL CALC-MCNC: 142 MG/DL (ref 0–100)
LYMPHOCYTES # BLD AUTO: 2.27 10*3/MM3 (ref 0.7–3.1)
LYMPHOCYTES NFR BLD AUTO: 38.7 % (ref 19.6–45.3)
MCH RBC QN AUTO: 31.5 PG (ref 26.6–33)
MCHC RBC AUTO-ENTMCNC: 34.3 G/DL (ref 31.5–35.7)
MCV RBC AUTO: 91.7 FL (ref 79–97)
MONOCYTES # BLD AUTO: 0.41 10*3/MM3 (ref 0.1–0.9)
MONOCYTES NFR BLD AUTO: 7 % (ref 5–12)
NEUTROPHILS # BLD AUTO: 2.67 10*3/MM3 (ref 1.7–7)
NEUTROPHILS NFR BLD AUTO: 45.4 % (ref 42.7–76)
NRBC BLD AUTO-RTO: 0 /100 WBC (ref 0–0.2)
PLATELET # BLD AUTO: 198 10*3/MM3 (ref 140–450)
POTASSIUM SERPL-SCNC: 4 MMOL/L (ref 3.5–5.2)
PROLACTIN SERPL-MCNC: 10.2 NG/ML (ref 4–15.2)
PROT SERPL-MCNC: 6.5 G/DL (ref 6–8.5)
RBC # BLD AUTO: 5.15 10*6/MM3 (ref 4.14–5.8)
SODIUM SERPL-SCNC: 143 MMOL/L (ref 136–145)
TESTOST FREE SERPL-MCNC: 20.6 PG/ML (ref 9.3–26.5)
TESTOST SERPL-MCNC: 616 NG/DL (ref 264–916)
TRIGL SERPL-MCNC: 122 MG/DL (ref 0–150)
TSH SERPL DL<=0.005 MIU/L-ACNC: 1.54 UIU/ML (ref 0.27–4.2)
VLDLC SERPL CALC-MCNC: 22 MG/DL (ref 5–40)
WBC # BLD AUTO: 5.87 10*3/MM3 (ref 3.4–10.8)

## 2022-05-20 NOTE — PROGRESS NOTES
Please let the patient know that his labs are all in normal range.  His hormones are also in normal range.

## 2022-07-13 ENCOUNTER — OFFICE VISIT (OUTPATIENT)
Dept: INTERNAL MEDICINE | Facility: CLINIC | Age: 27
End: 2022-07-13

## 2022-07-13 VITALS
BODY MASS INDEX: 23.33 KG/M2 | TEMPERATURE: 98 F | RESPIRATION RATE: 17 BRPM | SYSTOLIC BLOOD PRESSURE: 121 MMHG | WEIGHT: 176 LBS | OXYGEN SATURATION: 100 % | HEIGHT: 73 IN | DIASTOLIC BLOOD PRESSURE: 74 MMHG | HEART RATE: 87 BPM

## 2022-07-13 DIAGNOSIS — H93.12 TINNITUS OF LEFT EAR: Primary | ICD-10-CM

## 2022-07-13 DIAGNOSIS — M26.12 JAW ASYMMETRY: ICD-10-CM

## 2022-07-13 DIAGNOSIS — M26.609 TMJ (TEMPOROMANDIBULAR JOINT DISORDER): ICD-10-CM

## 2022-07-13 PROCEDURE — 99213 OFFICE O/P EST LOW 20 MIN: CPT | Performed by: INTERNAL MEDICINE

## 2022-07-13 NOTE — PROGRESS NOTES
Chief Complaint   Patient presents with   • Jaw Pain     Right sided started yesterday-not teeth are not lined up   • Hearing Problem     Request referral-left is worse.  On going problem       Subjective     History of Present Illness   Duy Thompson is a 26 y.o. male presenting for follow up after he developed sudden right sided jaw pain and locking while talking yesterday while drinking sodas.  He has noticed that since this episode, his chronic tinnitus on the left ear has worsened.  He has decreased hearing from the left side and wishes to be seen for hearing evaluation.      As far as his jaw, he has some mobility that has returned but his teeth are not matching up.      The following portions of the patient's history were reviewed and updated as appropriate: allergies, current medications, past family history, past medical history, past social history, past surgical history and problem list.    Review of Systems   Constitutional: Negative for chills, fatigue and fever.   HENT: Negative for congestion, ear pain, rhinorrhea, sinus pressure and sore throat.    Eyes: Negative for visual disturbance.   Respiratory: Negative for cough, chest tightness, shortness of breath and wheezing.    Cardiovascular: Negative for chest pain, palpitations and leg swelling.   Gastrointestinal: Negative for abdominal pain, blood in stool, constipation, diarrhea, nausea and vomiting.   Endocrine: Negative for polydipsia and polyuria.   Genitourinary: Negative for dysuria and hematuria.   Musculoskeletal: Negative for arthralgias and back pain.   Skin: Negative for rash.   Neurological: Negative for dizziness, light-headedness, numbness and headaches.   Psychiatric/Behavioral: Negative for dysphoric mood and sleep disturbance. The patient is not nervous/anxious.        No Known Allergies    Past Medical History:   Diagnosis Date   • ADHD (attention deficit hyperactivity disorder)    • Depression    • PTSD (post-traumatic stress  "disorder)    • Suicide attempt (HCC)    • UTI (urinary tract infection)        Social History     Socioeconomic History   • Marital status:    Tobacco Use   • Smoking status: Never Smoker   • Smokeless tobacco: Never Used   Substance and Sexual Activity   • Alcohol use: No   • Drug use: No   • Sexual activity: Defer        Past Surgical History:   Procedure Laterality Date   • APPENDECTOMY  2013   • EYE SURGERY Bilateral     as a child       Family History   Problem Relation Age of Onset   • Alcohol abuse Father    • Suicide Attempts Brother    • Alcohol abuse Brother    • Drug abuse Paternal Aunt    • Suicide Attempts Maternal Grandmother    • Drug abuse Cousin    • Lung cancer Paternal Grandmother          Current Outpatient Medications:   •  loratadine (CLARITIN) 10 MG tablet, Take 10 mg by mouth Daily. PRN, Disp: , Rfl:     Objective   /74   Pulse 87   Temp 98 °F (36.7 °C)   Resp 17   Ht 185.4 cm (73\")   Wt 79.8 kg (176 lb)   SpO2 100%   BMI 23.22 kg/m²     Physical Exam  Vitals and nursing note reviewed.   Constitutional:       Appearance: Normal appearance. He is well-developed.   HENT:      Head: Normocephalic and atraumatic.      Ears:      Comments: Right lower jaw tenderness near molar teeth with opening mouth.  Pain with side to side motion.   Eyes:      Extraocular Movements: Extraocular movements intact.      Conjunctiva/sclera: Conjunctivae normal.   Pulmonary:      Effort: Pulmonary effort is normal.   Musculoskeletal:      Cervical back: Normal range of motion and neck supple.   Skin:     General: Skin is warm and dry.      Findings: No rash.   Neurological:      General: No focal deficit present.      Mental Status: He is alert and oriented to person, place, and time.   Psychiatric:         Mood and Affect: Mood normal.         Behavior: Behavior normal.         Assessment & Plan   Diagnoses and all orders for this visit:    1. Tinnitus of left ear (Primary)  -     Ambulatory " Referral to ENT (Otolaryngology)    2. TMJ (temporomandibular joint disorder)  -     Ambulatory Referral to ENT (Otolaryngology)    3. Jaw asymmetry  -     Ambulatory Referral to ENT (Otolaryngology)          Discussion Summary:  Patient is a 26 y.o. male presenting for follow up.    Tinnitus and hearing loss  - would benefit from evaluation with ENT.  Referral placed.     TMJ disorder / jaw asymmetry.   -  Acute issue, possibly acute arthritis, advised on ibuprofen OTC to help with inflammation, if there is not improvement in a week or so we can consider imaging studies or OMFS referral.      Follow up:  No follow-ups on file.

## 2023-06-01 ENCOUNTER — OFFICE VISIT (OUTPATIENT)
Dept: INTERNAL MEDICINE | Facility: CLINIC | Age: 28
End: 2023-06-01

## 2023-06-01 VITALS
BODY MASS INDEX: 22.93 KG/M2 | WEIGHT: 173 LBS | HEIGHT: 73 IN | OXYGEN SATURATION: 98 % | TEMPERATURE: 97.9 F | DIASTOLIC BLOOD PRESSURE: 70 MMHG | SYSTOLIC BLOOD PRESSURE: 120 MMHG | HEART RATE: 73 BPM

## 2023-06-01 DIAGNOSIS — R21 RASH: Primary | ICD-10-CM

## 2023-06-01 PROCEDURE — 99213 OFFICE O/P EST LOW 20 MIN: CPT | Performed by: FAMILY MEDICINE

## 2023-06-01 RX ORDER — NYSTATIN AND TRIAMCINOLONE ACETONIDE 100000; 1 [USP'U]/G; MG/G
1 OINTMENT TOPICAL 2 TIMES DAILY
Qty: 60 G | Refills: 1 | Status: SHIPPED | OUTPATIENT
Start: 2023-06-01

## 2023-06-01 NOTE — PROGRESS NOTES
Duy Thompson is a 27 y.o. male.    Chief Complaint   Patient presents with   • Rash     Red and itchy. Pt states it was originally on the underside of his penis but is now in and around his belly button and nipples. Pt states he has been applying lotrimin but it doesn't seem to make a difference. Also states he tried getting in the swimming pool hoping the chlorine would take care of it but there was no change.        HPI     Duy Thompson is a 27-year-old male with date of birth 1995. He presents today with a rash.     The patient states the rash started on the underside of his penis and it was gone for 2 days. His rash later began in his belly button and nipples. He has itching, especially in the morning and at nights. He has used Lotrimin and an anti-itch cream. He has tried getting inside of the pool to see if the coolness would relieve his itching. He denies having any pain. He has not applied a steroid cream to the affected areas. The patient [place] before this happened, and he was exposed to certain plants that looked like posion ivy. The patient did try a new soap all over his body, but went back to his normal soap. He is not taking Claritin daily.     The following portions of the patient's history were reviewed and updated as appropriate: allergies, current medications, past family history, past medical history, past social history, past surgical history and problem list.     No Known Allergies      Current Outpatient Medications:   •  nystatin-triamcinolone (MYCOLOG) 906264-6.1 UNIT/GM-% ointment, Apply 1 application topically to the appropriate area as directed 2 (Two) Times a Day., Disp: 60 g, Rfl: 1    ROS    Review of Systems   Constitutional: Negative for chills and fever.   HENT: Negative for congestion, postnasal drip, sore throat and swollen glands.    Respiratory: Negative for cough, shortness of breath and wheezing.    Cardiovascular: Negative for chest pain and  "leg swelling.   Skin: Positive for rash.       Vitals:    06/01/23 0931   BP: 120/70   Pulse: 73   Temp: 97.9 °F (36.6 °C)   SpO2: 98%   Weight: 78.5 kg (173 lb)   Height: 185.4 cm (73\")     Body mass index is 22.82 kg/m².    Physical Exam     Physical Exam  Constitutional:       General: He is not in acute distress.     Appearance: He is well-developed.   HENT:      Head: Normocephalic and atraumatic.      Right Ear: External ear normal.      Left Ear: External ear normal.   Eyes:      Extraocular Movements: Extraocular movements intact.      Conjunctiva/sclera: Conjunctivae normal.   Cardiovascular:      Rate and Rhythm: Normal rate.   Pulmonary:      Effort: Pulmonary effort is normal. No respiratory distress.   Abdominal:      General: There is no distension.   Skin:     General: Skin is warm and dry.      Findings: Rash present.      Comments: Has an erythematous rash to his upper chest and navel. There does appear to be satellite lesions to the upper chest as well.    Neurological:      Mental Status: He is alert and oriented to person, place, and time.      Cranial Nerves: No cranial nerve deficit.   Psychiatric:         Mood and Affect: Mood normal.         Behavior: Behavior normal.         Assessment/Plan    Diagnoses and all orders for this visit:    1. Rash (Primary)  Assessment & Plan:  We will treat with topical nystatin-triamcinolone cream. Discussed with patient that rash has both properties of fungal infection and contact dermatitis. If no improvement with nystatin-triamcinolone, patient is to notify the office.       Other orders  -     nystatin-triamcinolone (MYCOLOG) 848942-6.1 UNIT/GM-% ointment; Apply 1 application topically to the appropriate area as directed 2 (Two) Times a Day.  Dispense: 60 g; Refill: 1      New Medications Ordered This Visit   Medications   • nystatin-triamcinolone (MYCOLOG) 667343-9.1 UNIT/GM-% ointment     Sig: Apply 1 application topically to the appropriate area as " directed 2 (Two) Times a Day.     Dispense:  60 g     Refill:  1       No orders of the defined types were placed in this encounter.      No follow-ups on file.    Kristine Brady DO       Transcribed from ambient dictation for Kristine Brady DO by Monika Sanchez.  06/01/23   12:44 EDT    Patient or patient representative verbalized consent to the visit recording.  I have personally performed the services described in this document as transcribed by the above individual, and it is both accurate and complete.  Kristine Brady DO  6/1/2023  13:04 EDT

## 2024-04-22 ENCOUNTER — TELEPHONE (OUTPATIENT)
Dept: INTERNAL MEDICINE | Facility: CLINIC | Age: 29
End: 2024-04-22
Payer: COMMERCIAL

## 2024-04-22 NOTE — TELEPHONE ENCOUNTER
Patient transferred from the [facility] because patient was having heart palpitations and refused the ED. Patient states that he has been having these palpitations on and off for the past 2 weeks. No SOA or chest pain. Appointment scheduled for Friday 04/26 with  per patient request. Advised to discontinue caffeine and energy drinks and to proceed to the ED for SOA or Chest pain. Patient is agreeable to this.

## 2024-04-26 ENCOUNTER — OFFICE VISIT (OUTPATIENT)
Dept: INTERNAL MEDICINE | Facility: CLINIC | Age: 29
End: 2024-04-26
Payer: COMMERCIAL

## 2024-04-26 VITALS
TEMPERATURE: 98 F | DIASTOLIC BLOOD PRESSURE: 84 MMHG | RESPIRATION RATE: 14 BRPM | HEART RATE: 86 BPM | OXYGEN SATURATION: 100 % | HEIGHT: 73 IN | SYSTOLIC BLOOD PRESSURE: 126 MMHG | WEIGHT: 176 LBS | BODY MASS INDEX: 23.33 KG/M2

## 2024-04-26 DIAGNOSIS — R00.2 PALPITATIONS: Primary | ICD-10-CM

## 2024-04-26 LAB
ALBUMIN SERPL-MCNC: 4.8 G/DL (ref 3.5–5.2)
ALBUMIN/GLOB SERPL: 2.4 G/DL
ALP SERPL-CCNC: 70 U/L (ref 39–117)
ALT SERPL-CCNC: 53 U/L (ref 1–41)
AST SERPL-CCNC: 27 U/L (ref 1–40)
BILIRUB SERPL-MCNC: 0.7 MG/DL (ref 0–1.2)
BUN SERPL-MCNC: 10 MG/DL (ref 6–20)
BUN/CREAT SERPL: 10.6 (ref 7–25)
CALCIUM SERPL-MCNC: 9.7 MG/DL (ref 8.6–10.5)
CHLORIDE SERPL-SCNC: 103 MMOL/L (ref 98–107)
CO2 SERPL-SCNC: 26.1 MMOL/L (ref 22–29)
CREAT SERPL-MCNC: 0.94 MG/DL (ref 0.76–1.27)
EGFRCR SERPLBLD CKD-EPI 2021: 111.8 ML/MIN/1.73
ERYTHROCYTE [DISTWIDTH] IN BLOOD BY AUTOMATED COUNT: 13 % (ref 12.3–15.4)
GLOBULIN SER CALC-MCNC: 2 GM/DL
GLUCOSE SERPL-MCNC: 87 MG/DL (ref 65–99)
HCT VFR BLD AUTO: 46.8 % (ref 37.5–51)
HGB BLD-MCNC: 15.8 G/DL (ref 13–17.7)
MCH RBC QN AUTO: 30.8 PG (ref 26.6–33)
MCHC RBC AUTO-ENTMCNC: 33.8 G/DL (ref 31.5–35.7)
MCV RBC AUTO: 91.2 FL (ref 79–97)
PLATELET # BLD AUTO: 210 10*3/MM3 (ref 140–450)
POTASSIUM SERPL-SCNC: 4.2 MMOL/L (ref 3.5–5.2)
PROT SERPL-MCNC: 6.8 G/DL (ref 6–8.5)
RBC # BLD AUTO: 5.13 10*6/MM3 (ref 4.14–5.8)
SODIUM SERPL-SCNC: 141 MMOL/L (ref 136–145)
TSH SERPL DL<=0.005 MIU/L-ACNC: 1.71 UIU/ML (ref 0.27–4.2)
WBC # BLD AUTO: 5.99 10*3/MM3 (ref 3.4–10.8)

## 2024-04-26 RX ORDER — MAGNESIUM GLUCONATE 27 MG(500)
27 TABLET ORAL 2 TIMES DAILY
COMMUNITY

## 2024-04-26 RX ORDER — UBIDECARENONE 100 MG
100 CAPSULE ORAL DAILY
COMMUNITY

## 2024-04-26 RX ORDER — MULTIPLE VITAMINS W/ MINERALS TAB 9MG-400MCG
1 TAB ORAL DAILY
COMMUNITY

## 2024-04-26 NOTE — PROGRESS NOTES
"Subjective  Duy Thompson is a 28 y.o. male    HPI 28-year-old male without significant past medical history coming to establish care here has had complaints of at least 2 bouts of palpitations which may last for a few minutes he feels his heart rate is irregular at that time.  There is no associated lightheadedness dizziness.  He denies significant alcohol use the symptoms seem to occur sporadically and spontaneously.  He denies OTC use of decongestants or stimulants denies excessive caffeine use    The following portions of the patient's history were reviewed and updated as appropriate: allergies, current medications, past family history, past medical history, past social history, past surgical history, and problem list.     Review of Systems   Constitutional: Negative.  Negative for activity change, appetite change, fatigue and fever.   HENT:  Negative for congestion, ear discharge, ear pain and trouble swallowing.    Eyes:  Negative for photophobia and visual disturbance.   Respiratory:  Negative for cough and shortness of breath.    Cardiovascular:  Positive for chest pain and palpitations.   Gastrointestinal:  Negative for abdominal distention, abdominal pain, constipation, diarrhea, nausea and vomiting.   Endocrine: Negative.    Genitourinary:  Negative for dysuria, hematuria and urgency.   Musculoskeletal:  Negative for arthralgias, back pain, joint swelling and myalgias.   Skin:  Negative for color change and rash.   Allergic/Immunologic: Negative.    Neurological:  Negative for dizziness, weakness, light-headedness and headaches.   Hematological:  Negative for adenopathy. Does not bruise/bleed easily.   Psychiatric/Behavioral:  Negative for agitation, confusion and dysphoric mood. The patient is not nervous/anxious.        Visit Vitals  /84   Pulse 86   Temp 98 °F (36.7 °C) (Infrared)   Resp 14   Ht 185.4 cm (73\")   Wt 79.8 kg (176 lb)   SpO2 100%   BMI 23.22 kg/m²       Objective  Physical " Exam  Constitutional:       General: He is not in acute distress.     Appearance: He is well-developed.   HENT:      Nose: Nose normal.   Eyes:      General: No scleral icterus.     Conjunctiva/sclera: Conjunctivae normal.   Neck:      Thyroid: No thyromegaly.      Trachea: No tracheal deviation.   Cardiovascular:      Rate and Rhythm: Normal rate and regular rhythm.      Heart sounds: No murmur heard.     No friction rub.   Pulmonary:      Effort: No respiratory distress.      Breath sounds: No wheezing or rales.   Abdominal:      General: There is no distension.      Palpations: Abdomen is soft. There is no mass.      Tenderness: There is no abdominal tenderness. There is no guarding.   Musculoskeletal:         General: No deformity. Normal range of motion.   Lymphadenopathy:      Cervical: No cervical adenopathy.   Skin:     General: Skin is warm and dry.      Findings: No erythema or rash.   Neurological:      Mental Status: He is alert and oriented to person, place, and time.      Cranial Nerves: No cranial nerve deficit.      Coordination: Coordination normal.      Deep Tendon Reflexes: Reflexes are normal and symmetric.   Psychiatric:         Behavior: Behavior normal.         Thought Content: Thought content normal.         Judgment: Judgment normal.       Diagnoses and all orders for this visit:    Palpitations exam unremarkable check Holter monitor along with lab work I have suggested that he stop taking his supplements for now  -     Holter Monitor - 72 Hour Up To 15 Days; Future  -     Comprehensive Metabolic Panel  -     CBC (No Diff)  -     TSH Rfx On Abnormal To Free T4    Other orders  -     magnesium gluconate (MAGONATE) 500 MG tablet; Take 1 tablet by mouth 2 (Two) Times a Day.  -     coenzyme Q10 100 MG capsule; Take 1 capsule by mouth Daily.  -     multivitamin with minerals tablet tablet; Take 1 tablet by mouth Daily.        BMI is within normal parameters. No other follow-up for BMI  required.      Answers submitted by the patient for this visit:  Primary Reason for Visit (Submitted on 4/26/2024)  What is the primary reason for your visit?: Other  Other (Submitted on 4/26/2024)  Please describe your symptoms.: Chest pain, irregular heartbeat  Have you had these symptoms before?: No  How long have you been having these symptoms?: 5-7 days  Please list any medications you are currently taking for this condition.: None  Please describe any probable cause for these symptoms. : None

## 2024-05-03 ENCOUNTER — TELEPHONE (OUTPATIENT)
Dept: INTERNAL MEDICINE | Facility: CLINIC | Age: 29
End: 2024-05-03
Payer: COMMERCIAL

## 2024-05-03 RX ORDER — ONDANSETRON 4 MG/1
4 TABLET, ORALLY DISINTEGRATING ORAL EVERY 8 HOURS PRN
Qty: 20 TABLET | Refills: 0 | Status: SHIPPED | OUTPATIENT
Start: 2024-05-03

## 2024-08-19 ENCOUNTER — TELEPHONE (OUTPATIENT)
Dept: INTERNAL MEDICINE | Facility: CLINIC | Age: 29
End: 2024-08-19
Payer: COMMERCIAL

## 2024-08-19 NOTE — TELEPHONE ENCOUNTER
Caller: Duy Thompson    Relationship: Self    Best call back number: [phone]     Who is your current provider:     Is your current provider offboarding? NO    Who would you like your new provider to be:     What are your reasons for transferring care: PATIENT HAS HAD APPOINTMENTS WITH  AND PREFERS TREATMENT WITH .    Additional notes: PLEASE CALL PATIENT WHEN AND IF THIS CAN BE APPROVED, PATIENT WOULD LIKE TO MAKE AN APPOINTMENT WITH  DUE TO CLOUDY URINE.

## 2024-08-20 ENCOUNTER — TELEPHONE (OUTPATIENT)
Dept: INTERNAL MEDICINE | Facility: CLINIC | Age: 29
End: 2024-08-20

## 2024-08-20 ENCOUNTER — OFFICE VISIT (OUTPATIENT)
Dept: INTERNAL MEDICINE | Facility: CLINIC | Age: 29
End: 2024-08-20
Payer: COMMERCIAL

## 2024-08-20 VITALS
BODY MASS INDEX: 22.8 KG/M2 | OXYGEN SATURATION: 100 % | WEIGHT: 172 LBS | DIASTOLIC BLOOD PRESSURE: 70 MMHG | HEIGHT: 73 IN | TEMPERATURE: 98.2 F | SYSTOLIC BLOOD PRESSURE: 124 MMHG | RESPIRATION RATE: 20 BRPM | HEART RATE: 81 BPM

## 2024-08-20 DIAGNOSIS — R12 HEARTBURN: ICD-10-CM

## 2024-08-20 DIAGNOSIS — R82.90 CLOUDY URINE: Primary | ICD-10-CM

## 2024-08-20 LAB
BILIRUB BLD-MCNC: NEGATIVE MG/DL
CLARITY, POC: CLEAR
COLOR UR: YELLOW
EXPIRATION DATE: NORMAL
GLUCOSE UR STRIP-MCNC: NEGATIVE MG/DL
KETONES UR QL: NEGATIVE
LEUKOCYTE EST, POC: NEGATIVE
Lab: NORMAL
NITRITE UR-MCNC: NEGATIVE MG/ML
PH UR: 6 [PH] (ref 5–8)
PROT UR STRIP-MCNC: NEGATIVE MG/DL
RBC # UR STRIP: NEGATIVE /UL
SP GR UR: 1.01 (ref 1–1.03)
UROBILINOGEN UR QL: NORMAL

## 2024-08-20 PROCEDURE — 99214 OFFICE O/P EST MOD 30 MIN: CPT | Performed by: PHYSICIAN ASSISTANT

## 2024-08-20 PROCEDURE — 81003 URINALYSIS AUTO W/O SCOPE: CPT | Performed by: PHYSICIAN ASSISTANT

## 2024-08-20 RX ORDER — FAMOTIDINE 40 MG/1
40 TABLET, FILM COATED ORAL DAILY
Qty: 30 TABLET | Refills: 1 | Status: SHIPPED | OUTPATIENT
Start: 2024-08-20

## 2024-08-20 NOTE — PROGRESS NOTES
"     Office Visit      Patient Name: Duy Thompson  : 1995   MRN: [id number]     Chief Complaint:    Chief Complaint   Patient presents with    Dysuria     Cloudy and odor     Chest Pain     This morning, but has been having them periodically since getting the covid vaccine       History of Present Illness: Duy Thompson is a 28 y.o. male who is here today to discuss dysuria and chest pain.     He has noticed his urine has been cloudy and malodorous for around 1 month. He feels he drinks plenty of fluids and should not be dehydrated. No burning with urination or increased urinary frequency or urgency. No flank pain or abdominal pain. No penile discharge.     He has had episodes of chest pain periodically since receiving a COVID immunization. He experienced an episode of substernal chest pain this morning lasting around 1.5 hours. There is some chest tenderness in the area he describes the pain. He did have some increased pain in the area when taking a deep breath, but was not actually SOA. He was evaluated for palpitations in April with unremarkable Holter study. Chest pain episodes do not seem to occur with exertion, typically occurring at rest. He does occasionally have heartburn. He tried Tums this morning which may have helped.         Subjective      I have reviewed and the following portions of the patient's history were updated as appropriate: past family history, past medical history, past social history, past surgical history and problem list.      Current Outpatient Medications:     famotidine (Pepcid) 40 MG tablet, Take 1 tablet by mouth Daily., Disp: 30 tablet, Rfl: 1    magnesium gluconate (MAGONATE) 500 MG tablet, Take 1 tablet by mouth 2 (Two) Times a Day., Disp: , Rfl:     No Known Allergies    Objective     Vital Signs:   Vitals:    24 1308   BP: 124/70   Pulse: 81   Resp: 20   Temp: 98.2 °F (36.8 °C)   SpO2: 100%   Weight: 78 kg (172 lb)   Height: 185.4 cm (73\")     Body mass index " is 22.69 kg/m².    Physical Exam  Vitals and nursing note reviewed.   Constitutional:       General: He is not in acute distress.     Appearance: He is well-developed. He is not ill-appearing, toxic-appearing or diaphoretic.   HENT:      Head: Normocephalic and atraumatic.      Right Ear: External ear normal.      Left Ear: External ear normal.   Eyes:      General: No scleral icterus.     Extraocular Movements: Extraocular movements intact.      Conjunctiva/sclera: Conjunctivae normal.      Pupils: Pupils are equal, round, and reactive to light.   Cardiovascular:      Rate and Rhythm: Normal rate and regular rhythm.      Heart sounds: Normal heart sounds. No murmur heard.     No friction rub. No gallop.   Pulmonary:      Effort: Pulmonary effort is normal. No respiratory distress.      Breath sounds: Normal breath sounds. No stridor. No wheezing, rhonchi or rales.   Chest:      Chest wall: No tenderness.   Abdominal:      General: Abdomen is flat. Bowel sounds are normal. There is no distension.      Palpations: Abdomen is soft. There is no mass.      Tenderness: There is abdominal tenderness in the epigastric area and left upper quadrant. There is no right CVA tenderness, left CVA tenderness, guarding or rebound.   Musculoskeletal:         General: No tenderness or deformity. Normal range of motion.      Cervical back: Normal range of motion and neck supple. No tenderness.      Right lower leg: No edema.      Left lower leg: No edema.   Lymphadenopathy:      Cervical: No cervical adenopathy.   Skin:     General: Skin is warm and dry.      Capillary Refill: Capillary refill takes less than 2 seconds.      Coloration: Skin is not jaundiced or pale.      Findings: No rash.   Neurological:      Mental Status: He is alert and oriented to person, place, and time.      Cranial Nerves: No cranial nerve deficit.      Sensory: No sensory deficit.      Motor: No abnormal muscle tone.      Coordination: Coordination normal.       Gait: Gait normal.      Deep Tendon Reflexes: Reflexes normal.   Psychiatric:         Mood and Affect: Mood normal.         Behavior: Behavior normal.         Thought Content: Thought content normal.         Judgment: Judgment normal.         Common labs          4/26/2024    11:05   Common Labs   Glucose 87    BUN 10    Creatinine 0.94    Sodium 141    Potassium 4.2    Chloride 103    Calcium 9.7    Total Protein 6.8    Albumin 4.8    Total Bilirubin 0.7    Alkaline Phosphatase 70    AST (SGOT) 27    ALT (SGPT) 53    WBC 5.99    Hemoglobin 15.8    Hematocrit 46.8    Platelets 210      Brief Urine Lab Results  (Last result in the past 365 days)        Color   Clarity   Blood   Leuk Est   Nitrite   Protein   CREAT   Urine HCG        08/20/24 1316 Yellow   Clear   Negative   Negative   Negative   Negative                     Assessment / Plan      Assessment/Plan:   Diagnoses and all orders for this visit:    1. Cloudy urine (Primary)  -     POCT urinalysis dipstick, automated  -     Chlamydia trachomatis, Neisseria gonorrhoeae, Trichomonas vaginalis, PCR - Urine, Urine, Clean Catch  -     Urine Culture - , Urine, Clean Catch    2. Heartburn  -     famotidine (Pepcid) 40 MG tablet; Take 1 tablet by mouth Daily.  Dispense: 30 tablet; Refill: 1       Chest pain consistent with GI cause, begin 2-4 week trial of Pepcid 40 mg daily.     Urinalysis unremarkable today, sending for culture and STI screening.         Follow Up:   Return if symptoms worsen or fail to improve.    Patient was given instructions and counseling regarding his condition or for health maintenance advice. Please see specific information pulled into the AVS if appropriate.     Randi Crockett PA-C  [facility]     Please note that portions of this note may have been completed with a voice recognition program.

## 2024-08-22 LAB
BACTERIA UR CULT: NO GROWTH
BACTERIA UR CULT: NORMAL
C TRACH RRNA SPEC QL NAA+PROBE: NEGATIVE
N GONORRHOEA RRNA SPEC QL NAA+PROBE: NEGATIVE
T VAGINALIS RRNA SPEC QL NAA+PROBE: NEGATIVE

## 2024-09-21 ENCOUNTER — HOSPITAL ENCOUNTER (EMERGENCY)
Facility: HOSPITAL | Age: 29
Discharge: HOME | End: 2024-09-21
Attending: EMERGENCY MEDICINE
Payer: COMMERCIAL

## 2024-09-21 VITALS
HEART RATE: 91 BPM | BODY MASS INDEX: 21.87 KG/M2 | HEIGHT: 73 IN | SYSTOLIC BLOOD PRESSURE: 125 MMHG | TEMPERATURE: 99.3 F | RESPIRATION RATE: 16 BRPM | WEIGHT: 165 LBS | OXYGEN SATURATION: 96 % | DIASTOLIC BLOOD PRESSURE: 86 MMHG

## 2024-09-21 DIAGNOSIS — R40.4 AWARENESS ALTERATION, TRANSIENT: Primary | ICD-10-CM

## 2024-09-21 LAB
ALBUMIN SERPL-MCNC: 4.7 G/DL (ref 3.4–4.8)
ALBUMIN/GLOB SERPL: 1.8 {RATIO} (ref 0.8–2)
ALP SERPL-CCNC: 70 U/L (ref 25–100)
ALT SERPL-CCNC: 33 U/L (ref 4–36)
AMPHET UR QL SCN: NORMAL
ANION GAP SERPL CALCULATED.3IONS-SCNC: 10 MMOL/L (ref 3–16)
AST SERPL-CCNC: 20 U/L (ref 8–33)
BARBITURATES UR QL SCN: NORMAL
BASOPHILS # BLD: 0.1 K/UL (ref 0–0.1)
BASOPHILS NFR BLD: 1.1 %
BENZODIAZ UR QL SCN: NORMAL
BILIRUB SERPL-MCNC: 0.5 MG/DL (ref 0.3–1.2)
BILIRUB UR QL STRIP.AUTO: NEGATIVE
BUN SERPL-MCNC: 10 MG/DL (ref 6–20)
BUPRENORPHINE QUAL, URINE: NORMAL
CALCIUM SERPL-MCNC: 9.8 MG/DL (ref 8.5–10.5)
CANNABINOIDS UR QL SCN: NORMAL
CHLORIDE SERPL-SCNC: 105 MMOL/L (ref 98–107)
CLARITY UR: CLEAR
CO2 SERPL-SCNC: 27 MMOL/L (ref 20–30)
COCAINE UR QL SCN: NORMAL
COLOR UR: YELLOW
CREAT SERPL-MCNC: 0.9 MG/DL (ref 0.4–1.2)
DRUG SCREEN COMMENT UR-IMP: NORMAL
EOSINOPHIL # BLD: 0 K/UL (ref 0–0.4)
EOSINOPHIL NFR BLD: 0 %
ERYTHROCYTE [DISTWIDTH] IN BLOOD BY AUTOMATED COUNT: 12.5 % (ref 11–16)
FENTANYL SCREEN, URINE: NORMAL
GFR SERPLBLD CREATININE-BSD FMLA CKD-EPI: >90 ML/MIN/{1.73_M2}
GLOBULIN SER CALC-MCNC: 2.6 G/DL
GLUCOSE SERPL-MCNC: 102 MG/DL (ref 74–106)
GLUCOSE UR STRIP.AUTO-MCNC: NEGATIVE MG/DL
HCT VFR BLD AUTO: 43.2 % (ref 40–54)
HGB BLD-MCNC: 14.9 G/DL (ref 13–18)
HGB UR QL STRIP.AUTO: NEGATIVE
IMM GRANULOCYTES # BLD: 0 K/UL
IMM GRANULOCYTES NFR BLD: 0.1 % (ref 0–5)
KETONES UR STRIP.AUTO-MCNC: NEGATIVE MG/DL
LEUKOCYTE ESTERASE UR QL STRIP.AUTO: NEGATIVE
LYMPHOCYTES # BLD: 1.7 K/UL (ref 1.5–4)
LYMPHOCYTES NFR BLD: 19.5 %
MCH RBC QN AUTO: 31.2 PG (ref 27–32)
MCHC RBC AUTO-ENTMCNC: 34.5 G/DL (ref 31–35)
MCV RBC AUTO: 90.4 FL (ref 80–100)
METHADONE UR QL SCN: NORMAL
METHAMPHET UR QL SCN: NORMAL
MONOCYTES # BLD: 0.4 K/UL (ref 0.2–0.8)
MONOCYTES NFR BLD: 4.3 %
NEUTROPHILS # BLD: 6.4 K/UL (ref 2–7.5)
NEUTS SEG NFR BLD: 75 %
NITRITE UR QL STRIP.AUTO: NEGATIVE
OPIATES UR QL SCN: NORMAL
OXYCODONE UR QL SCN: NORMAL
PCP UR QL SCN: NORMAL
PH UR STRIP.AUTO: 6 [PH] (ref 5–8)
PLATELET # BLD AUTO: 231 K/UL (ref 150–400)
PMV BLD AUTO: 10.2 FL (ref 6–10)
POTASSIUM SERPL-SCNC: 4.1 MMOL/L (ref 3.4–5.1)
PROCALCITONIN SERPL IA-MCNC: 0.03 NG/ML (ref 0–0.15)
PROT SERPL-MCNC: 7.3 G/DL (ref 6.4–8.3)
PROT UR STRIP.AUTO-MCNC: NEGATIVE MG/DL
RBC # BLD AUTO: 4.78 M/UL (ref 4.5–6)
SODIUM SERPL-SCNC: 142 MMOL/L (ref 136–145)
SP GR UR STRIP.AUTO: >=1.03 (ref 1–1.03)
TRICYCLICS UR QL SCN: NORMAL
UA COMPLETE W REFLEX CULTURE PNL UR: NORMAL
UA DIPSTICK W REFLEX MICRO PNL UR: NORMAL
URN SPEC COLLECT METH UR: NORMAL
UROBILINOGEN UR STRIP-ACNC: 0.2 E.U./DL
WBC # BLD AUTO: 8.5 K/UL (ref 4–11)

## 2024-09-21 PROCEDURE — 99283 EMERGENCY DEPT VISIT LOW MDM: CPT

## 2024-09-21 PROCEDURE — 85025 COMPLETE CBC W/AUTO DIFF WBC: CPT

## 2024-09-21 PROCEDURE — 81003 URINALYSIS AUTO W/O SCOPE: CPT

## 2024-09-21 PROCEDURE — 80307 DRUG TEST PRSMV CHEM ANLYZR: CPT

## 2024-09-21 PROCEDURE — 36415 COLL VENOUS BLD VENIPUNCTURE: CPT

## 2024-09-21 PROCEDURE — 84145 PROCALCITONIN (PCT): CPT

## 2024-09-21 PROCEDURE — G0480 DRUG TEST DEF 1-7 CLASSES: HCPCS

## 2024-09-21 PROCEDURE — 80053 COMPREHEN METABOLIC PANEL: CPT

## 2024-09-21 ASSESSMENT — LIFESTYLE VARIABLES
HOW MANY STANDARD DRINKS CONTAINING ALCOHOL DO YOU HAVE ON A TYPICAL DAY: PATIENT DOES NOT DRINK
HOW OFTEN DO YOU HAVE A DRINK CONTAINING ALCOHOL: NEVER

## 2024-09-21 ASSESSMENT — PAIN SCALES - GENERAL: PAINLEVEL_OUTOF10: 0

## 2024-09-21 ASSESSMENT — PAIN - FUNCTIONAL ASSESSMENT: PAIN_FUNCTIONAL_ASSESSMENT: 0-10

## 2024-09-23 ENCOUNTER — TELEPHONE (OUTPATIENT)
Dept: INTERNAL MEDICINE | Facility: CLINIC | Age: 29
End: 2024-09-23
Payer: COMMERCIAL

## 2024-09-23 NOTE — TELEPHONE ENCOUNTER
Due to language barriers with Dr Flowers, pt would like to switch to you as PCP. Would you accept?

## 2024-09-26 ENCOUNTER — OFFICE VISIT (OUTPATIENT)
Dept: INTERNAL MEDICINE | Facility: CLINIC | Age: 29
End: 2024-09-26
Payer: COMMERCIAL

## 2024-09-26 VITALS
SYSTOLIC BLOOD PRESSURE: 110 MMHG | WEIGHT: 166 LBS | TEMPERATURE: 98 F | HEIGHT: 73 IN | OXYGEN SATURATION: 98 % | DIASTOLIC BLOOD PRESSURE: 68 MMHG | BODY MASS INDEX: 22 KG/M2 | RESPIRATION RATE: 16 BRPM | HEART RATE: 66 BPM

## 2024-09-26 DIAGNOSIS — R41.0 CONFUSION: Primary | ICD-10-CM

## 2024-09-26 PROCEDURE — 99214 OFFICE O/P EST MOD 30 MIN: CPT | Performed by: INTERNAL MEDICINE

## 2024-09-30 NOTE — TELEPHONE ENCOUNTER
Called and left VM for patient that we do not currently have a provider in the office taking new patients.  Recommended that he contact Piyush Shi MD to schedule appointment.

## 2024-10-18 ENCOUNTER — TELEPHONE (OUTPATIENT)
Dept: INTERNAL MEDICINE | Facility: CLINIC | Age: 29
End: 2024-10-18
Payer: COMMERCIAL

## 2024-10-18 NOTE — TELEPHONE ENCOUNTER
Ramila with [employer] called and said that his insurance would not approve the MRI and wanted to do a CT first.  Would you like to change order or complete a peer to peer for MRI approval.  Documentation was scanned into media for peer to peer.  If you have any questions you can contact Ramila at [phone]

## 2024-10-28 DIAGNOSIS — R41.0 CONFUSION: Primary | ICD-10-CM

## 2024-12-20 ENCOUNTER — TELEPHONE (OUTPATIENT)
Dept: INTERNAL MEDICINE | Facility: CLINIC | Age: 29
End: 2024-12-20
Payer: COMMERCIAL